# Patient Record
Sex: FEMALE | Race: WHITE | Employment: FULL TIME | URBAN - METROPOLITAN AREA
[De-identification: names, ages, dates, MRNs, and addresses within clinical notes are randomized per-mention and may not be internally consistent; named-entity substitution may affect disease eponyms.]

---

## 2017-06-01 PROBLEM — R10.2 PERINEAL PAIN: Status: ACTIVE | Noted: 2017-06-01

## 2017-06-01 PROBLEM — K64.9 HEMORRHOIDS: Status: ACTIVE | Noted: 2017-06-01

## 2017-06-01 PROBLEM — R32 INCONTINENCE OF URINE IN FEMALE: Status: ACTIVE | Noted: 2017-06-01

## 2017-07-03 PROBLEM — N95.0 POSTMENOPAUSAL BLEEDING: Status: ACTIVE | Noted: 2017-07-03

## 2017-07-12 ENCOUNTER — HOSPITAL ENCOUNTER (OUTPATIENT)
Dept: LAB | Age: 57
Discharge: HOME | End: 2017-07-12

## 2017-07-12 PROBLEM — N93.9 ABNORMAL UTERINE BLEEDING (AUB): Status: ACTIVE | Noted: 2017-07-12

## 2017-07-12 PROBLEM — N88.2 CERVICAL STENOSIS (UTERINE CERVIX): Status: ACTIVE | Noted: 2017-07-12

## 2017-07-12 PROCEDURE — 88305 TISSUE EXAM BY PATHOLOGIST: CPT | Performed by: OBSTETRICS & GYNECOLOGY

## 2019-08-07 PROBLEM — N81.6 RECTOCELE: Status: ACTIVE | Noted: 2019-08-07

## 2019-08-07 PROBLEM — N32.81 OAB (OVERACTIVE BLADDER): Status: ACTIVE | Noted: 2019-08-07

## 2019-08-20 ENCOUNTER — HOSPITAL ENCOUNTER (OUTPATIENT)
Dept: PHYSICAL THERAPY | Age: 59
Discharge: HOME | End: 2019-08-20
Attending: OBSTETRICS & GYNECOLOGY
Payer: COMMERCIAL

## 2019-08-20 DIAGNOSIS — N32.81 OAB (OVERACTIVE BLADDER): ICD-10-CM

## 2019-08-20 PROCEDURE — 97110 THERAPEUTIC EXERCISES: CPT

## 2019-08-20 PROCEDURE — 97161 PT EVAL LOW COMPLEX 20 MIN: CPT

## 2019-08-20 NOTE — THERAPY EVALUATION
Miesha Lutz  : 1960  Primary: Za Del Valle  Secondary:  [address]  at [facility]  [address], [place]  [phone]   [phone]        OUTPATIENT PHYSICAL THERAPY:Initial Assessment 2019   ICD-10: Treatment Diagnosis: R27.8 Lack of coordination (muscle incoordination), N81.84 Pelvic Muscle Wasting, N39.46 Mixed incontinence (Urge and stress incontinence), N81.6 Rectocele (Prolapse of posterior vaginal wall)  Precautions/Allergies:   Patient has no known allergies. TREATMENT PLAN:  Effective Dates: 2019 TO 2019 (90 days). Frequency/Duration: 1 time a week for 90 Day(s) MEDICAL/REFERRING DIAGNOSIS:  OAB (overactive bladder) [N32.81]   DATE OF ONSET: chronic  REFERRING PHYSICIAN: Yuri Onofre DO MD Orders: evaluate and treat  Return MD Appointment: 2019     INITIAL ASSESSMENT:  Ms. Javy Trent presents with pelvic floor muscle (PFM) lack of coordination, timing, awareness, endurance and strength. She is unable to pair a PFM contraction with normal breathing, contributing to stress urinary incontinence (TRINO) and prolapse progression risk. She also has some mild bladder health habits that may be worsening symptoms and contributing to constipation. She will benefit from skilled PT with an emphasis on PFM retraining, core retraining, body mechanics and bladder/bowel health education to improve her evacuation and improved urinary control. She is pleasant and motivated. PROBLEM LIST (Impacting functional limitations):  1. Decreased Strength  2. Decreased ADL/Functional Activities  3. Decreased Gordon with Home Exercise Program  4. Decreased timing, coordination and awareness INTERVENTIONS PLANNED:  1. Electrical Stimulation  2. Home Exercise Program (HEP)  3. Manual Therapy  4. Neuromuscular Re-education/Strengthening  5. Therapeutic Activites  6.  Therapeutic Exercise/Strengthening     GOALS: (Goals have been discussed and agreed upon with patient.)  Short-Term Functional Goals: Time Frame: 6 weeks  Pt will demonstrate I with basic PFM HEP to improve awareness, coordination, and timing of PFM. Pt will be able to demonstrate isolated PFM contraction without compensation. Pt will independently perform proper toilet position to improve bowel emptying and decrease pain with bowel movements. Discharge Goals: Time Frame: 12 weeks  1. Pt will be able to verbalize understanding of prolapse management in order to reduce risk from progression of prolapse. 2. Pt will demonstrate 10 quick flicks of the pelvic floor muscle group, without compensation, to implement urge suppression appropriately with urgency of urination and decrease the number of pads used per day. 3. Pt will be able to demonstrate use of 'the knack' in order to reduce UI with stress related activities. Outcome Measure:   Pelvic Floor Impact Questionnaire (PFIQ-7)  Score (out of 300) Initial: 8/20/2019  Bladder/urinary: 10  Bowel/rectum: 10  Vagina/pelvis: 10  Total: 30 Most Recent:      Interpretation of Score: This survey asks questions concerning certain bowel, bladder, or pelvic symptoms and how much these symptoms interfere with daily activities. Each section is scored on a 0-3 scale, 3 representing the greatest disability. The scores of each section (out of 100) are added together for a total score out of 300. Medical Necessity:   · Patient is expected to demonstrate progress in strength, range of motion, coordination and functional technique to decrease risk from progression of prolapse and improve bowel evacuation. Reason for Services/Other Comments:  · Patient continues to require skilled intervention due to above mentioned deficits.   Total Duration: Evaluation 40 minutes, treatment 20 minutes  PT Patient Time In/Time Out  Time In: 1610  Time Out: 1720    Rehabilitation Potential For Stated Goals: Good  Regarding Jake Kumar therapy, I certify that the treatment plan above will be carried out by a therapist or under their direction. Thank you for this referral,  Golden Chlid, PT, DPT               PAIN/SUBJECTIVE:   Initial: Pain Intensity 1: 0  Post Session:  0/10     HISTORY:   History of Present Injury/Illness (Reason for Referral):  Pt is a 62 yo F. She had a bladder sling in 2006/2007 due to TRINO and UUI. This helped for some time. About a year ago she noticed a bulge in vaginal area. She saw her GYN but was not provided any options for treatment at that time. She found Dr. Annie Moody and saw her recently. Reports significant difficulty emptying her bowels, \"I feel like I have to lay down to get things out\". Urinary: Frequency 7-8 x/day, 1-2 x/night. Positive for stress urinary incontinence (TRINO)-mild, urge urinary incontinence (UUI), incomplete emptying, urinary hesitancy, nocturia. Pt occasionally uses pads for protection; 0-1 pads per day (PPD). Denies urgency, frequency, dysuria, hematuria. Fluid intake: 40-60 oz water/day; bladder irritants include: gatorade, 2c coffee, tea (occasionally)  Bowel: Frequency 2-3x/day. Positive for pain with bowel movement (BM), pushing/straining with BM, incomplete emptying, fecal incontinence-smearing, occasionally, constipation. Holiday stool type: variable. Use of stool softeners or laxatives? Yes  Sexual: Pt is sexually active. Male partners. Positive for dyspareunia, \"burning\"  Pelvic Organ Prolapse/Pelvic Pain: Location: vaginal pain w/ intercourse, rectal pain w/ BM. Worse with bowel movements, intercourse. Relieved with nothing. Max pain 2-3/10. Min pain 0/10. Past Medical History/Comorbidities:   Ms. Brad Thompson  has a past medical history of GERD (gastroesophageal reflux disease), Hypercholesterolemia, and Thyroid disease.   Ms. Brad Thompson  has a past surgical history that includes hx urological; hx dilation and evacuation; hx wrist fracture tx (Left); and hx colonoscopy. Social History/Living Environment:     Pt lives with , daughter and granddaughter  Have you ever had any pelvic trauma (orthopedic in nature, fall, MVA, etc.)? No  Have you ever experienced any unwanted physical or sexual contact? No  Have you ever experienced any form of medical trauma (GYN, urological, GI, etc)? No    Prior Level of Function/Work/Activity:  Pt is a school nurse at [employer]     Ambulatory/Rehab [address] [place] [address] Risk Assessment    Risk Factors:       No Risk Factors Identified Ability to Rise from Chair:       (0)  Ability to rise in a single movement    Falls Prevention Plan:       No modifications necessary   Total: (5 or greater = High Risk): 0    ©2010 [facility] [address] [place] [address] States Patent #[id number]. Federal Law prohibits the replication, distribution or use without written permission from [facility] [employer]     Current Medications:       Current Outpatient Medications:     metFORMIN (GLUCOPHAGE) 1,000 mg tablet, Take 1,000 mg by mouth., Disp: , Rfl:     rosuvastatin (CRESTOR) 20 mg tablet, TK 1 T PO QHS, Disp: , Rfl: 0    cholecalciferol, vitD3,/vit K2 (VITAMIN D3-VITAMIN K2 PO), Take  by mouth., Disp: , Rfl:     PROGESTERONE, Take 50 mg by mouth., Disp: , Rfl:     mirabegron ER (MYRBETRIQ) 25 mg ER tablet, Take 1 Tab by mouth daily. Indications: Urine Leakage When there is a Strong Desire to Void, Disp: 30 Tab, Rfl: 12    esomeprazole (NEXIUM) 20 mg capsule, Take  by mouth daily. , Disp: , Rfl:     hydrocortisone-pramoxine (ANALPRAM-HC) 2.5-1 % (4g) cream, Apply  to affected area three (3) times daily. , Disp: 1 Tube, Rfl: 5   Date Last Reviewed:  8/21/2019   Number of Personal Factors/Comorbidities that affect the Plan of Care: 0: LOW COMPLEXITY   EXAMINATION:   OBSERVATION:   External Observation:   · Voluntary Contraction: present  · Voluntary Relaxation: present    PALPATION: Non tender with palpation via vaginal canal. Mild burning sensation with palpation of superficial mm. Significantly distended rectum limited depth of palpation. RANGE OF MOTION:  Limited 2/2 weakness    STRENGTH:  P: Power, E: Endurance, R: Repetitions, QF: Quick Flicks, TrA: Transverse Abdominus  P trace   E Unable to maintain   R NT   QF NT   TrA      SPECIAL TESTS:  POP-Q: (Pelvic Organ Prolapse - Quantification Exam) per MD note:   ICS Stage                     Anterior:  Stage 0   Posterior:  Stage 3   Apical:  Stage 0      Body Structures Involved:  1. Muscles Body Functions Affected:  1. Sensory/Pain  2. Genitourinary  3. Neuromusculoskeletal  4. Movement Related Activities and Participation Affected:  1. Learning and Applying Knowledge  2. General Tasks and Demands  3. Mobility  4. Self Care   Number of elements (examined above) that affect the Plan of Care: 1-2: LOW COMPLEXITY   CLINICAL PRESENTATION:   Presentation: Stable and uncomplicated: LOW COMPLEXITY   CLINICAL DECISION MAKING:   Use of outcome tool(s) and clinical judgement create a POC that gives a: Clear prediction of patient's progress: LOW COMPLEXITY        Laxmi Johnson, PT, DPT

## 2019-08-21 NOTE — PROGRESS NOTES
Taylor Carolin  : 1960  Primary: Verdis [place]  Secondary:  [address]  at [facility]  [address], [address], [place] [address]  [phone]   [phone]      OUTPATIENT PHYSICAL THERAPY: Daily Treatment Note 2019   Visit Count:  1  ICD-10: Treatment Diagnosis: R27.8 Lack of coordination (muscle incoordination), N81.84 Pelvic Muscle Wasting, N39.46 Mixed incontinence (Urge and stress incontinence), N81.6 Rectocele (Prolapse of posterior vaginal wall)  Precautions/Allergies:   Patient has no known allergies. TREATMENT PLAN:  Effective Dates: 2019 TO 2019 (90 days). Frequency/Duration: 1 time a week for 90 Day(s) MEDICAL/REFERRING DIAGNOSIS:  OAB (overactive bladder) [N32.81]   DATE OF ONSET: chronic  REFERRING PHYSICIAN: Neftaly Warner DO MD Orders: evaluate and treat  Return MD Appointment: 2019     Pre-treatment Symptoms/Complaints:  Rectocele, incomplete emptying, OAB  Pain: Initial: Pain Intensity 1: 0  Post Session:  0/10   Medications Last Reviewed:  2019  Updated Objective Findings:  See evaluation note from today   TREATMENT:   THERAPEUTIC EXERCISE: (20 minutes):  Exercises per grid below to improve mobility, coordination and positioning. Required moderate verbal cues to promote proper body breathing techniques and improve isolation of contraction. Progressed repetitions as indicated. TE= therapeutic exercise, TA= therapeutic activity, MT= manual therapy, NE= neuro re-education   Date:  2019 Date:   Date:     Activity/Exercise Parameters Parameters Parameters   PFM coordination Isolation of contraction w/ max VC     Evacuation strategies Toilet positioning, splinting 10 minutes                                     Pt gives verbal consent to internal vaginal assessment/treatment without chaperon present.     Treatment/Session Summary:    · Response to Treatment:  Pt reports good understanding of plan of care, as well as prescribed home exercise program.  All questions were answered to pt's satisfaction. Pt was invited to call with any further questions or concerns. · Communication/Consultation:  None today  · Equipment provided today:  None today  · Recommendations/Intent for next treatment session: Next visit will focus on strength, coordination, biofeedback.   Total Treatment Billable Duration: Evaluation 40 minutes, treatment 20 minutes  PT Patient Time In/Time Out  Time In: 1610  Time Out: [address] N[place], PT, DPT    Future Appointments   Date Time Provider Jeanne Pena   9/16/2019  3:15 PM Julio C Hartmann, [address]

## 2019-09-26 ENCOUNTER — HOSPITAL ENCOUNTER (OUTPATIENT)
Dept: PHYSICAL THERAPY | Age: 59
Discharge: HOME | End: 2019-09-26
Payer: COMMERCIAL

## 2019-09-26 PROCEDURE — 97110 THERAPEUTIC EXERCISES: CPT

## 2019-09-26 PROCEDURE — 97530 THERAPEUTIC ACTIVITIES: CPT

## 2019-09-26 NOTE — PROGRESS NOTES
Chaim Sanchez  : 1960  Primary: Arabella Del Valle  Secondary:  [address]  at [facility]  [address], [address], [place] [address]  [phone]   [phone]      OUTPATIENT PHYSICAL THERAPY: Daily Treatment Note 2019   Visit Count:  2  ICD-10: Treatment Diagnosis: R27.8 Lack of coordination (muscle incoordination), N81.84 Pelvic Muscle Wasting, N39.46 Mixed incontinence (Urge and stress incontinence), N81.6 Rectocele (Prolapse of posterior vaginal wall)  Precautions/Allergies:   Patient has no known allergies. TREATMENT PLAN:  Effective Dates: 2019 TO 2019 (90 days). Frequency/Duration: 1 time a week for 90 Day(s) MEDICAL/REFERRING DIAGNOSIS:  OAB (overactive bladder) [N32.81]   DATE OF ONSET: chronic  REFERRING PHYSICIAN: Jorge Holt DO MD Orders: evaluate and treat  Return MD Appointment: 2019     Pre-treatment Symptoms/Complaints:  Inconsistent with HEP. Having less leakage with mybetriq. Pain: Initial: Pain Intensity 1: 0  Post Session:  0/10   Medications Last Reviewed:  2019  Updated Objective Findings:  See evaluation note from today   TREATMENT:   THERAPEUTIC ACTIVITY: ( 10 minutes): Therapeutic activities per grid below to improve bowel consistency. Required minimal verbal cues to promote increase fiber in order to improve stool consistency. THERAPEUTIC EXERCISE: (45 minutes):  Exercises per grid below to improve mobility, strength, coordination and isolation of PFM. Required maximal verbal and tactile cues to promote proper body breathing techniques and improve isolation of contraction. Progressed resistance, range, repetitions and complexity of movement as indicated.   TE= therapeutic exercise, TA= therapeutic activity, MT= manual therapy, NE= neuro re-education   Date:  2019 Date:  2019 Date:     Activity/Exercise Parameters Parameters Parameters   PFM coordination Isolation of contraction w/ max VC Max verbal/tactile cuing to improve PFM recruitment and activation    Evacuation strategies Toilet positioning, splinting 10 minutes     TA bracing  W/ manual cuing and using exhale to improve TA activation    Hip adduction  3x10 w/ exhale to improve TA actviation    Bridge  x20    Glute sets  x30 while maintain normal breathing    HEP  TA bracing, adductor squeeze and PFM contraction      Pt gives verbal consent to internal vaginal assessment/treatment without chaperon present. Treatment/Session Summary:    · Response to Treatment:  Pt with significant difficult recruiting PFM without compensation even with max verbal and tactile cuing. She had improve PFM recruitment with use of glutes. We began overflow recruitment exercises today to help to improve PFM activation and strength to then transition into isolation. · Communication/Consultation:  None today  · Equipment provided today:  None today  · Recommendations/Intent for next treatment session: Next visit will focus on strength, coordination.   Total Treatment Billable Duration: 10 minutes TA, 45 minutes TE  PT Patient Time In/Time Out  Time In: 1700  Time Out: 1800  Karson Krueger PT, DPT    Future Appointments   Date Time Provider Jeanne Pena   10/3/2019  5:00 PM Elias Cespedes PT, DPT [facility]   10/8/2019  5:00 PM Elias Cespedes PT, DPT [facility]   10/15/2019  5:00 PM Elias Cespedes PT, DPT [facility]   2/10/2020  3:00 PM DO Joh[address]

## 2019-10-15 ENCOUNTER — APPOINTMENT (OUTPATIENT)
Dept: PHYSICAL THERAPY | Age: 59
End: 2019-10-15
Payer: COMMERCIAL

## 2019-10-23 ENCOUNTER — HOSPITAL ENCOUNTER (OUTPATIENT)
Dept: PHYSICAL THERAPY | Age: 59
Discharge: HOME | End: 2019-10-23
Payer: COMMERCIAL

## 2019-10-23 PROCEDURE — 97110 THERAPEUTIC EXERCISES: CPT

## 2019-10-23 NOTE — PROGRESS NOTES
Aye Marrufo  : 1960  Primary: Delmi Cobos [facility]  Secondary:  [address] at [facility]  [address], [address], [place] [address]  [phone]   [phone]      OUTPATIENT PHYSICAL THERAPY: Daily Treatment Note 10/23/2019   Visit Count:  3  ICD-10: Treatment Diagnosis: R27.8 Lack of coordination (muscle incoordination), N81.84 Pelvic Muscle Wasting, N39.46 Mixed incontinence (Urge and stress incontinence), N81.6 Rectocele (Prolapse of posterior vaginal wall)  Precautions/Allergies:   Patient has no known allergies. TREATMENT PLAN:  Effective Dates: 2019 TO 2019 (90 days). Frequency/Duration: 1 time a week for 90 Day(s) MEDICAL/REFERRING DIAGNOSIS:  OAB (overactive bladder) [N32.81]   DATE OF ONSET: chronic  REFERRING PHYSICIAN: Andrés Madsen DO MD Orders: evaluate and treat  Return MD Appointment: 2/10/2020     Pre-treatment Symptoms/Complaints:  Pt reports that medication is helping some. She has been increasing fiber. Does HEP exercises about 2x/day. She think that leakage is improving; has good and worse days. Pain: Initial: Pain Intensity 1: 0  Post Session:  0/10   Medications Last Reviewed:  10/23/2019  Updated Objective Findings:  Trace contraction; more consistent with ability to recruit   TREATMENT:   THERAPEUTIC ACTIVITY: ( 10 minutes): Therapeutic activities per grid below to improve bowel consistency. Required minimal verbal cues to promote increase fiber in order to improve stool consistency. THERAPEUTIC EXERCISE: (45 minutes):  Exercises per grid below to improve mobility, strength, coordination and isolation of PFM. Required maximal verbal and tactile cues to promote proper body breathing techniques and improve isolation of contraction. Progressed resistance, range, repetitions and complexity of movement as indicated.   TE= therapeutic exercise, TA= therapeutic activity, MT= manual therapy, NE= neuro re-education Date:  8/21/2019 Date:  9/26/2019 Date:  10/23/2019   Activity/Exercise Parameters Parameters Parameters   PFM coordination Isolation of contraction w/ max VC Max verbal/tactile cuing to improve PFM recruitment and activation W/ manual palpation and tactile cuing to improve PFM activation   Evacuation strategies Toilet positioning, splinting 10 minutes     TA bracing  W/ manual cuing and using exhale to improve TA activation    Hip adduction  3x10 w/ exhale to improve TA actviation 3x10 w/ exhale to improve TA actviation   Bridge  x20 3x10   Glute sets  x30 while maintain normal breathing    HEP  TA bracing, adductor squeeze and PFM contraction Add hip abd and marching; chosse 3 exercises to complete 2x/daily, continue with 3H and QF 3x/day   Hip abduction   3x10 w/ RTB   Marching   3x10 seated   Urge suppression   10 minutes     Pt gives verbal consent to internal vaginal assessment/treatment without chaperon present. Treatment/Session Summary:    · Response to Treatment:  Pt with slightly improved PFM activation; trace contraction still present, however more consistent. Exercises were progressed mostly in gravity reduce/eliminated positions in order to optimize PFM contraction. Pt was educated in urge suppression technique in order to implement during day to improve urinary control. · Communication/Consultation:  None today  · Equipment provided today:  None today  · Recommendations/Intent for next treatment session: Next visit will focus on strength, coordination.   Total Treatment Billable Duration: 55 minutes TE  PT Patient Time In/Time Out  Time In: 5160  Time Out: [address] [facility], PT, DPT    Future Appointments   Date Time Provider Jeanne Pena   10/30/2019  5:00 PM Elvi Christian DPT [facility]   11/14/2019  5:00 PM Iker Neal PT DPT [facility]   11/26/2019  4:00 PM [facility]   2/10/2020  3:00 PM DO Johan Herring.

## 2019-10-30 ENCOUNTER — HOSPITAL ENCOUNTER (OUTPATIENT)
Dept: PHYSICAL THERAPY | Age: 59
Discharge: HOME | End: 2019-10-30
Payer: COMMERCIAL

## 2019-10-30 PROCEDURE — 97110 THERAPEUTIC EXERCISES: CPT

## 2019-10-30 NOTE — PROGRESS NOTES
Balta Price  : 1960  Primary: Ernestina Castellon [facility]  Secondary:  [address] at [facility]  [address], [address], [place] [address]  [phone]   [phone]      OUTPATIENT PHYSICAL THERAPY: Daily Treatment Note 10/30/2019   Visit Count:  4  ICD-10: Treatment Diagnosis: R27.8 Lack of coordination (muscle incoordination), N81.84 Pelvic Muscle Wasting, N39.46 Mixed incontinence (Urge and stress incontinence), N81.6 Rectocele (Prolapse of posterior vaginal wall)  Precautions/Allergies:   Patient has no known allergies. TREATMENT PLAN:  Effective Dates: 2019 TO 2019 (90 days). Frequency/Duration: 1 time a week for 90 Day(s) MEDICAL/REFERRING DIAGNOSIS:  OAB (overactive bladder) [N32.81]   DATE OF ONSET: chronic  REFERRING PHYSICIAN: Hanna Awad DO MD Orders: evaluate and treat  Return MD Appointment: 2/10/2020     Pre-treatment Symptoms/Complaints:  Reports being able to using urge suppression with some success this last week. Pain: Initial: Pain Intensity 1: 0  Post Session:  0/10   Medications Last Reviewed:  10/30/2019  Updated Objective Findings:  None Today   TREATMENT:   THERAPEUTIC ACTIVITY: ( 10 minutes): Therapeutic activities per grid below to improve bowel consistency. Required minimal verbal cues to promote increase fiber in order to improve stool consistency. THERAPEUTIC EXERCISE: (45 minutes):  Exercises per grid below to improve mobility, strength, coordination and isolation of PFM. Required maximal verbal and tactile cues to promote proper body breathing techniques and improve isolation of contraction. Progressed resistance, range, repetitions and complexity of movement as indicated.   TE= therapeutic exercise, TA= therapeutic activity, MT= manual therapy, NE= neuro re-education   Date:  2019 Date:  2019 Date:  10/23/2019 Date:  10/30/2019   Activity/Exercise Parameters Parameters Parameters    PFM coordination Isolation of contraction w/ max VC Max verbal/tactile cuing to improve PFM recruitment and activation W/ manual palpation and tactile cuing to improve PFM activation    Evacuation strategies Toilet positioning, splinting 10 minutes      TA bracing  W/ manual cuing and using exhale to improve TA activation     Hip adduction  3x10 w/ exhale to improve TA actviation 3x10 w/ exhale to improve TA actviation 3x10 w/ exhale to improve TA actviation   Bridge  x20 3x10 3x10 w/ hip add  3x10 w/ shoulder press     Glute sets  x30 while maintain normal breathing     HEP  TA bracing, adductor squeeze and PFM contraction Add hip abd and marching; chosse 3 exercises to complete 2x/daily, continue with 3H and QF 3x/day    Hip abduction   3x10 w/ RTB 3x10 w/ RTB   Marching   3x10 seated 3x10   Urge suppression   10 minutes    Stepper    10 minutes   Sit to stand    3x10   Step up    2x10   Sidestepping     2x40ft RTB     Pt gives verbal consent to internal vaginal assessment/treatment without chaperon present. Treatment/Session Summary:    · Response to Treatment:  Pt tolerated progression of exercises well with attention to PFM contraction and proper use of breath in order to minimize pelvic pressure and pelvic organ support. She demonstrates good awareness of breathing and was able to progress into more gravity dependent positions with more complex movement patterns. · Communication/Consultation:  None today  · Equipment provided today:  None today  · Recommendations/Intent for next treatment session: Next visit will focus on strength, coordination.   Total Treatment Billable Duration: 55 minutes TE  PT Patient Time In/Time Out  Time In: Won Prince PT, DPT    Future Appointments   Date Time Provider Jeanne Pena   11/14/2019  5:00 PM Jose Manuel Gibson, PT, DPT [facility]   11/26/2019  4:00 PM Tobias Johnson, PT, DPT [facility]   2/10/2020  3:00 PM Aniyah Lopez , DO Johan Andrew.

## 2019-11-14 ENCOUNTER — HOSPITAL ENCOUNTER (OUTPATIENT)
Dept: PHYSICAL THERAPY | Age: 59
Discharge: HOME | End: 2019-11-14
Payer: COMMERCIAL

## 2019-11-14 PROCEDURE — 97110 THERAPEUTIC EXERCISES: CPT

## 2019-11-14 NOTE — PROGRESS NOTES
Jana Ryan  : 1960  Primary: [facility]  Secondary:  [address] at [facility]  [address], [address], [place]  [phone]   [phone]      OUTPATIENT PHYSICAL THERAPY: Daily Treatment Note 2019   Visit Count:  5  ICD-10: Treatment Diagnosis: R27.8 Lack of coordination (muscle incoordination), N81.84 Pelvic Muscle Wasting, N39.46 Mixed incontinence (Urge and stress incontinence), N81.6 Rectocele (Prolapse of posterior vaginal wall)  Precautions/Allergies:   Patient has no known allergies. TREATMENT PLAN:  Effective Dates: 2019 TO 2019 (90 days). Frequency/Duration: 1 time a week for 90 Day(s) MEDICAL/REFERRING DIAGNOSIS:  OAB (overactive bladder) [N32.81]   DATE OF ONSET: chronic  REFERRING PHYSICIAN: Christiano Albert DO  MD Orders: evaluate and treat  Return MD Appointment: 2/10/2020     Pre-treatment Symptoms/Complaints: Pt reports only having one \"bad\" episode of incontinence. She thinks that she had drank a lot more caffeine that day. States that she is not having much leakage in the AM upon waking, which used to happen. Pain: Initial: Pain Intensity 1: 0  Post Session:  0/10   Medications Last Reviewed:  2019  Updated Objective Findings:  None Today   TREATMENT:   THERAPEUTIC EXERCISE: (55 minutes):  Exercises per grid below to improve mobility, strength and coordination. Required minimal verbal and tactile cues to promote proper body breathing techniques. Progressed resistance, range, repetitions and complexity of movement as indicated.   TE= therapeutic exercise, TA= therapeutic activity, MT= manual therapy, NE= neuro re-education   Date:  2019 Date:  2019 Date:  10/23/2019 Date:  10/30/2019 Date:  2019   Activity/Exercise Parameters Parameters Parameters     PFM coordination Isolation of contraction w/ max VC Max verbal/tactile cuing to improve PFM recruitment and activation W/ manual palpation and tactile cuing to improve PFM activation     Evacuation strategies Toilet positioning, splinting 10 minutes       TA bracing  W/ manual cuing and using exhale to improve TA activation      Hip adduction  3x10 w/ exhale to improve TA actviation 3x10 w/ exhale to improve TA actviation 3x10 w/ exhale to improve TA actviation 3x10 w/ exhale to improve PFM activation   Bridge  x20 3x10 3x10 w/ hip add  3x10 w/ shoulder press   3x10 w/ hip add  3x10 w/ hip abd RTB   Glute sets  x30 while maintain normal breathing      HEP  TA bracing, adductor squeeze and PFM contraction Add hip abd and marching; chosse 3 exercises to complete 2x/daily, continue with 3H and QF 3x/day     Hip abduction   3x10 w/ RTB 3x10 w/ RTB 3x10 w/ RTB   Marching   3x10 seated 3x10    Urge suppression   10 minutes     Stepper    10 minutes 10 minutes   Sit to stand    3x10    Step up    2x10    Sidestepping     2x40ft RTB 2x40ft RTB   Walking monster     2x40ft RTB     Pt gives verbal consent to internal vaginal assessment/treatment without chaperon present. Treatment/Session Summary:    · Response to Treatment:  Pt is progressing with exercises. She has increased challenge with incorporation of more standing exercises. She was encouraged to practice kegels in various positions as able to increase challenge. · Communication/Consultation:  None today  · Equipment provided today:  None today  · Recommendations/Intent for next treatment session: Next visit will focus on reassessment of strength.   Total Treatment Billable Duration: 55 minutes TE  PT Patient Time In/Time Out  Time In: 1700  Time Out: 1800  Asia Azevedo PT, DPT    Future Appointments   Date Time Provider Jeanne Pena   11/26/2019  4:00 PM Elvi Ramirez, DPJOSE [facility]   2/10/2020  3:00 PM Luiz Lopez DO [address]

## 2019-11-26 ENCOUNTER — HOSPITAL ENCOUNTER (OUTPATIENT)
Dept: PHYSICAL THERAPY | Age: 59
Discharge: HOME | End: 2019-11-26
Payer: COMMERCIAL

## 2019-11-26 PROCEDURE — 97110 THERAPEUTIC EXERCISES: CPT

## 2019-11-26 NOTE — THERAPY DISCHARGE
Mahi Macdonald  : 1960  Primary: [facility]  Secondary:  [address]  at [facility]  [address] , [address], [place] [address]  [phone]   [phone]        OUTPATIENT PHYSICAL THERAPY:Discharge 2019   ICD-10: Treatment Diagnosis: R27.8 Lack of coordination (muscle incoordination), N81.84 Pelvic Muscle Wasting, N39.46 Mixed incontinence (Urge and stress incontinence), N81.6 Rectocele (Prolapse of posterior vaginal wall)  Precautions/Allergies:   Patient has no known allergies. TREATMENT PLAN:  Effective Dates: 2019 TO 2019 (90 days). Frequency/Duration: 1 time a week for 90 Day(s) MEDICAL/REFERRING DIAGNOSIS:  OAB (overactive bladder) [N32.81]   DATE OF ONSET: chronic  REFERRING PHYSICIAN: Shelbi Bustamante DO  MD Orders: evaluate and treat  Return MD Appointment: 2/10/2020     DISCHARGE ASSESSMENT:  Ms. Corey Cerna has been seen in skilled PT from 2019 to 2019, a total of 6 visits. She cancelled 2 treatments during her plan of care. Treatment has emphasized pelvic floor muscle (PFM) coordination, strength, voiding strategies, and bladder control and health techniques. Patient responded well to therapy, with improvements in urinary incontinence, bowel and bladder emptying, PFM strength and coordination and pelvic pressure symptoms. She has achieved her goals as indicated below and all questions have been answered to her satisfaction. GOALS: (Goals have been discussed and agreed upon with patient.)  Short-Term Functional Goals: Time Frame: 6 weeks  Pt will demonstrate I with basic PFM HEP to improve awareness, coordination, and timing of PFM. (MET 2019)  Pt will be able to demonstrate isolated PFM contraction without compensation. ([place] 2019)  Pt will independently perform proper toilet position to improve bowel emptying and decrease pain with bowel movements.  (MET 2019)  Discharge Goals: Time Frame: 12 weeks  1. Pt will be able to verbalize understanding of prolapse management in order to reduce risk from progression of prolapse. (MET 11/26/2019)  2. Pt will demonstrate 10 quick flicks of the pelvic floor muscle group, without compensation, to implement urge suppression appropriately with urgency of urination and decrease the number of pads used per day. (PROGRESSING 11/26/2019)  3. Pt will be able to demonstrate use of 'the knack' in order to reduce UI with stress related activities. (MET 11/26/2019)    History of Present Injury/Illness (Reason for Referral):  Pt is a 62 yo F. She had a bladder sling in 2006/2007 due to TRINO and UUI. This helped for some time. About a year ago she noticed a bulge in vaginal area. She saw her GYN but was not provided any options for treatment at that time. She found Dr. Shaan Hall and saw her recently. Reports significant difficulty emptying her bowels, \"I feel like I have to lay down to get things out\". Urinary: Frequency 4x/day, 0-1x/night. Positive for urge urinary incontinence (UUI)- using urge suppression to control and is doing well with this overall, incomplete emptying- only in AM. Pt does not use pads for protection; 0 pads per day (PPD). Denies urgency, frequency, dysuria, hematuria, stress urinary incontinence, urinary hesitancy. Fluid intake: 40-60 oz water/day; bladder irritants include: gatorade, 2c coffee, tea (occasionally)  Bowel: Frequency 1x/day. Positive for pushing/straining with BM- using miralax to minimize pushing, fecal incontinence-smearing, occasionally. Wilkinson stool type: variable. Use of stool softeners or laxatives? Yes; miralax, benefiber  Sexual: Pt is sexually active. Male partners. Positive for dyspareunia, \"burning\"  Pelvic Organ Prolapse/Pelvic Pain: Denies pain complaints.     UPDATED OBJECTIVE FINDINGS:    OBSERVATION:   External Observation:   · Voluntary Contraction: present, with compensation  · Voluntary Relaxation: present    PALPATION: Non tender with palpation via vaginal canal.     RANGE OF MOTION: Limited 2/2 weakness    STRENGTH:  P: Power, E: Endurance, R: Repetitions, QF: Quick Flicks, TrA: Transverse Abdominus  P 1/5   E 7 seconds   R 7   QF 8   TrA      Outcome Measure:   Pelvic Floor Impact Questionnaire (PFIQ-7)  Score (out of 300) Initial: 8/20/2019  Bladder/urinary: 10  Bowel/rectum: 10  Vagina/pelvis: 10  Total: 30 Most Recent: 11/26/2019  Bladder/urinary: 14  Bowel/rectum: 0  Vagina/pelvis: 0  Total: 14     Interpretation of Score: This survey asks questions concerning certain bowel, bladder, or pelvic symptoms and how much these symptoms interfere with daily activities. Each section is scored on a 0-3 scale, 3 representing the greatest disability. The scores of each section (out of 100) are added together for a total score out of 300.     Total Duration:  PT Patient Time In/Time Out  Time In: 1600  Time Out: 1700    Rehabilitation Potential For Stated Goals: Good  Thank you for this referral,  Geraldene Fabry, PT, DPT

## 2019-11-26 NOTE — PROGRESS NOTES
Christine Self  : 1960  Primary: [facility]  Secondary:  [address] at [facility]  [address], [address], [place] [address]  [phone]   [phone]      OUTPATIENT PHYSICAL THERAPY: Daily Treatment Note 2019   Visit Count:  6  ICD-10: Treatment Diagnosis: R27.8 Lack of coordination (muscle incoordination), N81.84 Pelvic Muscle Wasting, N39.46 Mixed incontinence (Urge and stress incontinence), N81.6 Rectocele (Prolapse of posterior vaginal wall)  Precautions/Allergies:   Patient has no known allergies. TREATMENT PLAN:  Effective Dates: 2019 TO 2019 (90 days). Frequency/Duration: 1 time a week for 90 Day(s) MEDICAL/REFERRING DIAGNOSIS:  OAB (overactive bladder) [N32.81]   DATE OF ONSET: chronic  REFERRING PHYSICIAN: Javier Lopez DO MD Orders: evaluate and treat  Return MD Appointment: 2/10/2020     Pre-treatment Symptoms/Complaints: Doing well over all. Less frequency of pelvic pressure. Pain: Initial: Pain Intensity 1: 0  Post Session:  0/10   Medications Last Reviewed:  2019  Updated Objective Findings:  See discharge assessment   TREATMENT:   THERAPEUTIC EXERCISE: (55 minutes):  Exercises per grid below to improve mobility, strength and coordination. Required minimal verbal and tactile cues to promote proper body breathing techniques. Progressed resistance, range, repetitions and complexity of movement as indicated.   TE= therapeutic exercise, TA= therapeutic activity, MT= manual therapy, NE= neuro re-education   Date:  2019 Date:  10/23/2019 Date:  10/30/2019 Date:  2019 Date:  2019   Activity/Exercise Parameters Parameters      PFM coordination Max verbal/tactile cuing to improve PFM recruitment and activation W/ manual palpation and tactile cuing to improve PFM activation   Coordination and strength w/ holds and QF; manual palpation   Evacuation strategies        TA bracing W/ manual cuing and using exhale to improve TA activation       Hip adduction 3x10 w/ exhale to improve TA actviation 3x10 w/ exhale to improve TA actviation 3x10 w/ exhale to improve TA actviation 3x10 w/ exhale to improve PFM activation    Bridge x20 3x10 3x10 w/ hip add  3x10 w/ shoulder press   3x10 w/ hip add  3x10 w/ hip abd RTB    Glute sets x30 while maintain normal breathing       HEP TA bracing, adductor squeeze and PFM contraction Add hip abd and marching; chosse 3 exercises to complete 2x/daily, continue with 3H and QF 3x/day   Updated and reviewed   Hip abduction  3x10 w/ RTB 3x10 w/ RTB 3x10 w/ RTB    Marching  3x10 seated 3x10     Urge suppression  10 minutes      Stepper   10 minutes 10 minutes    Sit to stand   3x10     Step up   2x10  x20 (B) anterior  x20 (B) lateral   Sidestepping    2x40ft RTB 2x40ft RTB 2x40ft RTB   Walking monster    2x40ft RTB 2x40ft RTB     Pt gives verbal consent to internal vaginal assessment/treatment without chaperon present.     Treatment/Session Summary:    · Response to Treatment:  See discharge assessment  · Communication/Consultation:  None today  · Equipment provided today:  None today  Total Treatment Billable Duration: 55 minutes TE  PT Patient Time In/Time Out  Time In: 1600  Time Out: [facility] Elizabeth, PT, DPT    Future Appointments   Date Time Provider Jeanne Pena   2/10/2020  3:00 PM [place], [address]

## 2020-02-10 PROBLEM — R32 INCONTINENCE OF URINE IN FEMALE: Status: RESOLVED | Noted: 2017-06-01 | Resolved: 2020-02-10

## 2020-02-10 PROBLEM — N88.2 CERVICAL STENOSIS (UTERINE CERVIX): Status: RESOLVED | Noted: 2017-07-12 | Resolved: 2020-02-10

## 2020-02-10 PROBLEM — R10.2 PERINEAL PAIN: Status: RESOLVED | Noted: 2017-06-01 | Resolved: 2020-02-10

## 2020-02-10 PROBLEM — N93.9 ABNORMAL UTERINE BLEEDING (AUB): Status: RESOLVED | Noted: 2017-07-12 | Resolved: 2020-02-10

## 2020-05-05 PROBLEM — N81.10 VAGINAL PROLAPSE: Status: ACTIVE | Noted: 2020-05-05

## 2020-06-01 ENCOUNTER — HOSPITAL ENCOUNTER (OUTPATIENT)
Dept: SURGERY | Age: 60
Discharge: HOME | End: 2020-06-01

## 2020-06-01 VITALS — BODY MASS INDEX: 29.66 KG/M2 | WEIGHT: 189 LBS | HEIGHT: 67 IN

## 2020-06-01 RX ORDER — ASCORBIC ACID 500 MG
500 TABLET ORAL DAILY
COMMUNITY

## 2020-06-01 NOTE — PERIOP NOTES
Patient verified name and . Order for consent not found in EHR   Type 1b surgery, PAT phone assessment complete. Orders not received. Labs per surgeon: None  Labs per anesthesia protocol: None      Patient answered medical/surgical history questions at their best of ability. All prior to admission medications documented in [facility]. Patient instructed to take the following medications the day of surgery according to anesthesia guidelines with a small sip of water:  Thyroid medication and Nexium . Hold all vitamins 7 days prior to surgery and NSAIDS 5 days prior to surgery. Prescription meds to hold:None    Patient instructed on the following:  >A negative Covid swab result is required to proceed with surgery; the swab will be collected 7 days prior to surgery at the [address] [facility] at [address]. The patient will be contacted by the Covid swab team for an appointment date and time. For questions or concerns the patient should call [phone]. The clinic is closed from 12-1 for lunch and on weekends. Patient states had Covid testing done today 2020  > 1 visitor allowed at this time. >Arrive at [facility], time of arrival to be called the day before by 1700  >NPO after midnight including gum, mints, and ice chips  >Responsible adult must drive patient to the hospital, stay during surgery, and patient will need supervision 24 hours after anesthesia  >Use antibacterial soap or hibiclens in shower the night before surgery and on the morning of surgery  >All piercings must be removed prior to arrival.    >Leave all valuables (money and jewelry) at home but bring insurance card and ID on       DOS. >Do not wear make-up, nail polish, lotions, cologne, perfumes, powders, or oil on skin. Patient teach back successful and patient demonstrates knowledge of instruction.

## 2020-06-05 RX ORDER — GABAPENTIN 300 MG/1
300 CAPSULE ORAL ONCE
Status: CANCELLED | OUTPATIENT
Start: 2020-06-05 | End: 2020-06-05

## 2020-06-05 RX ORDER — ACETAMINOPHEN 500 MG
1000 TABLET ORAL ONCE
Status: CANCELLED | OUTPATIENT
Start: 2020-06-05 | End: 2020-06-05

## 2020-06-05 NOTE — H&P
History and Physical    Patient: Melissa Díaz MRN: [id number]  SSN: [id number]    YOB: 1960  Age: 63 y.o. Sex: female        Chief Complaint:  Pelvic Organ Prolapse    History of Present Illness:  Melissa Díaz is a 63 y.o. female with POP. She has some vaginal pressure and it feels like she is sitting on something and she has pushed something back up in the shower. Last summer she went to see Dr. Sherie Serrano about this- likely has had symptoms for over a year now. She regularly pushes something back up every time she shower. She has not tried a pessary or surgery for this.      She is s/p sling for TRINO. She is still leaking urine. She leaks both day and night. She is now leaking with urgency. She is sometimes wearing pads. She was leaking with full bladder at least once per day. Mostly in the morning when she first wakes up. She also notices this with caffeine consumption. She did do PT years ago and again with [facility]. She tried detrol for a few days but is afraid it will cause constipation. She has also used Myrbetriq She has not had surgery for this specifically. After starting Myrbetriq 25mg: She is not leaking as much. She may leak 2-3 times per week early in the AM. She is not wearing pads. This is also an improvement for her. After months of doing HEP, she no longer needs Myrbetriq.      She drinks 4 cups of coffee in the morning, and an occasional tea  She does not use sweeteners  She has 1 drink every 2-3 weeks     She voids 6-7 times during the day. 4-5 times on Myrbetriq. She wakes up 2-3 times at night to void  She has up to 3 BM per day, she strains for 50% of her BM. She takes magnesium when she needs to     She is sexually active with her . She denies leaking during sex and denies discomfort.      Allergies:  No Known Allergies    Medications:  Pasterone  Zinc  Ascorbid Acid  Omega 3  Rosuvastatin  Cholecalciferol  Progesterone  Nexium  Hydrocortisone-pramoxine    Past Medical History:  Past Medical History:   Diagnosis Date    GERD (gastroesophageal reflux disease)     Hypercholesterolemia     Prediabetes     not taking medication at this time 06/01/2020    PUD (peptic ulcer disease)     Thyroid disease     takes thyroid medication       Past Surgical History:  Past Surgical History:   Procedure Laterality Date    HX COLONOSCOPY      HX DILATION AND EVACUATION      x 2    HX UROLOGICAL      sling    HX WRIST FRACTURE TX Left        Family History:  Family History   Problem Relation Age of Onset    Heart Disease Father     Emphysema Father          Social History:  Social History     Tobacco Use   Smoking Status Never Smoker   Smokeless Tobacco Never Used     Social History     Substance and Sexual Activity   Alcohol Use Yes    Comment: occasionally     Social History     Substance and Sexual Activity   Drug Use No       Review of Systems:  Review of Systems:    Constitutional: Negative for fatigue and fever. Skin: Negative for rash and itching. HENT: Negative for hearing loss and vertigo. Respiratory: Negative for cough and shortness of breath. Cardiovascular:  Negative for chest pain, leg swelling and palpitations. Gastrointestinal: Negative for abdominal pain, anal bleeding, constipation, diarrhea and hemorrhoids. Genitourinary: Negative for difficulty urinating, dyspareunia, dysuria, enuresis, frequency, genital sores, hematuria, menstrual problem, pelvic pain, pelvic pressure, urgency, decreased urine volume, vaginal bleeding, vaginal discharge, vaginal pain, vaginal burning, vaginal itching, vaginal odor and vaginal lesion. Neurological: Negative for dizziness, blackouts and headaches. Psychiatric/Behavioral: Negative for behavioral problem, nervous/anxious and insomnia. Endocrine: Negative for polydipsia and polyuria.    Female Endocrine: Negative for vaginal dryness, pain with intercourse and decreased libido. Physical Exam:  Physical Exam   Constitutional: She is oriented to person, place, and time. Vital signs are normal. She appears well-developed and well-nourished. She is cooperative. No distress. HENT:   Head: Normocephalic and atraumatic. Neck: Trachea normal. No thyroid mass present. Cardiovascular: Normal rate and normal heart sounds. Pulmonary/Chest: Effort normal. No accessory muscle usage. No respiratory distress. Abdominal: Soft. Normal appearance. She exhibits no distension and no mass. There is no hepatosplenomegaly. There is no tenderness. There is no rebound and no guarding. No hernia. Genitourinary: Pelvic exam was performed with patient supine. Musculoskeletal: She exhibits no edema or tenderness. Lymphadenopathy:     She has no cervical adenopathy. She has no axillary adenopathy. Neurological: She is alert and oriented to person, place, and time. Skin: Skin is warm and dry. No lesion and no rash noted. No erythema. Psychiatric: She has a normal mood and affect. Her speech is normal and behavior is normal. Judgment and thought content normal. Cognition and memory are normal. She does not express impulsivity or inappropriate judgment.        Female Genitourinary   Vulva: - Normal. No lesions  Bartholin's Gland - Bilateral - Normal, nontender  Skenes Gland- Bilateral-Normal, nontender   Clitoris - Normal.   Introitus: Characteristics - Normal.   Urethral Meatus: - Normal appearing, normal size, no lesions, no prolapse  Urethra: No masses, no tenderness  Vagina:  No atrophy, no discharge, no lesions  Cervix: - no lesions, no discharge  Uterus: -  no tenderness, normal mobility   Adnexa: - No masses palpated, no tenderness  Bladder - no tenderness, no masses palpated  Perineum - no lesions    Rectal   Anorectal Exam: - normal     POP-Q: (Pelvic Organ Prolapse - Quantification Exam):    -3 Aa -3 Ba -10 C   3.5 gh 3 pb 10 tvl   +2 Ap +2 Bp -9 D       ICS Stage            Anterior:  Stage 0  Posterior:  Stage 3  Apical:  Stage 0         Pelvic floor muscles: Tender Spasm   Contraction    R. Puborectalis: NO 0 /5  0 /5   L. Puborectalis: NO 0 /5  0 /5   R. Pubococcyg NO 0 /5  0 /5   L. Pubococcyg NO 0 /5  0 /5   R. Ileococcyg: NO 0 /5  0 /5   L. Ileococcyg: NO 0 /5  0 /5   R. Obturator Int: NO 0 /5  0 /5   L. Obturator Int: NO 0 /5  0 /5   R. Coccygeus: NO 0 /5  0 /5   L. Coccygeus: NO 0 /5  0 /5       Supine Stress Test of TRINO:  Negative     Assessment and Plan:  Rectocele  Assessment & Plan:  Rectocele  Assessment & Plan:  We discussed the epidemiology, pathogenesis and etiology of pelvic organ prolapse. We discussed the potential risk factors which include genetics and environmental factors, such as childbirth, aging, menopause, straining, and previous surgery. I offered her management options which included nothing, pessary, and surgery.      We discussed her options of correcting the prolapse through a vaginal approach and laparoscopic approach. We also discussed repairing the vaginal prolapse with mesh and the option to do this without mesh. She has decided she would like to have a posterior repair and perineorrhaphy.      We discussed the surgical technique involved in the prolapse repair. I described the anatomic principles to the surgery as well as the success rates of conventional and compensatory repairs.     There is a 20% chance of recurrence and 5% reoperation rate.      I described the surgical technique to be employed for her upcoming surgery. We discussed the anticipated postoperative course.     Risks, benefits, indications and alternatives of the surgery were discussed. Risks reviewed with the patient include bleeding, infections, injury to pelvic organs (bowel, bladder, urethra, ureters, blood vessels, and nerves), recurrence, dyspareunia, anesthetic complications, and death.  We discussed that other complications could arise and that the list is too long to discuss comprehensively.     We discussed that some patients do fail, although not all require another surgery. We discussed the risks of repair which include pain, dysparunia, bladder and/or bowel dysfunction and sexual dysfunction.      We discussed that the success of a native tissue repair is based on proper healing and scar formation. We discussed that some patients do fail, although not all require another surgery. We discussed the risks of repair which include pain, dysparunia, bladder and/or bowel dysfunction and sexual dysfunction.       After taking all of this into account she elects to have posterior repair and perineorrhaphy. 2. Vaginal prolapse  Assessment & Plan: We will also perform an exam under anesthesia and if there are any other areas of concern for prolapse, Ms. Viviana Joseph has given me permission to do surgical correction. We discussed the additional risks and benefits of anterior and apical surgery including cystoscopy. She is consented for posterior wall repair with perineorrhaphy, possible anterior and apical repair, possible cystoscopy. The patient was counseled at length about the risks of star Covid-19 during their perioperative period and any recovery window from their procedure. The patient was made aware that star Covid-19  may worsen their prognosis for recovering from their procedure and lend to a higher morbidity and/or mortality risk. All material risks, benefits, and reasonable alternatives including postponing the procedure were discussed. The patient does wish to proceed with the procedure at this time.               Signed By: Marquis Trent DO     June 5, 2020

## 2020-06-07 ENCOUNTER — ANESTHESIA EVENT (OUTPATIENT)
Dept: SURGERY | Age: 60
End: 2020-06-07
Payer: COMMERCIAL

## 2020-06-07 RX ORDER — MIDAZOLAM HYDROCHLORIDE 1 MG/ML
2 INJECTION, SOLUTION INTRAMUSCULAR; INTRAVENOUS ONCE
Status: CANCELLED | OUTPATIENT
Start: 2020-06-07 | End: 2020-06-07

## 2020-06-07 RX ORDER — FENTANYL CITRATE 50 UG/ML
100 INJECTION, SOLUTION INTRAMUSCULAR; INTRAVENOUS ONCE
Status: CANCELLED | OUTPATIENT
Start: 2020-06-07 | End: 2020-06-07

## 2020-06-08 ENCOUNTER — HOSPITAL ENCOUNTER (OUTPATIENT)
Age: 60
Setting detail: OUTPATIENT SURGERY
Discharge: HOME | End: 2020-06-08
Attending: OBSTETRICS & GYNECOLOGY | Admitting: OBSTETRICS & GYNECOLOGY
Payer: COMMERCIAL

## 2020-06-08 ENCOUNTER — ANESTHESIA (OUTPATIENT)
Dept: SURGERY | Age: 60
End: 2020-06-08
Payer: COMMERCIAL

## 2020-06-08 VITALS
DIASTOLIC BLOOD PRESSURE: 76 MMHG | HEIGHT: 67 IN | OXYGEN SATURATION: 92 % | RESPIRATION RATE: 16 BRPM | TEMPERATURE: 98.1 F | WEIGHT: 191 LBS | BODY MASS INDEX: 29.98 KG/M2 | HEART RATE: 84 BPM | SYSTOLIC BLOOD PRESSURE: 141 MMHG

## 2020-06-08 DIAGNOSIS — G89.18 POSTOPERATIVE PAIN: Primary | ICD-10-CM

## 2020-06-08 PROCEDURE — 77030040922 HC BLNKT HYPOTHRM STRY -A: Performed by: ANESTHESIOLOGY

## 2020-06-08 PROCEDURE — 74011250636 HC RX REV CODE- 250/636: Performed by: ANESTHESIOLOGY

## 2020-06-08 PROCEDURE — 77030021052 HC RNG RETRCTR STAY COOP -A: Performed by: OBSTETRICS & GYNECOLOGY

## 2020-06-08 PROCEDURE — 77030031139 HC SUT VCRL2 J&J -A: Performed by: OBSTETRICS & GYNECOLOGY

## 2020-06-08 PROCEDURE — 74011250636 HC RX REV CODE- 250/636: Performed by: OBSTETRICS & GYNECOLOGY

## 2020-06-08 PROCEDURE — 77030002966 HC SUT PDS J&J -A: Performed by: OBSTETRICS & GYNECOLOGY

## 2020-06-08 PROCEDURE — 74011250637 HC RX REV CODE- 250/637: Performed by: ANESTHESIOLOGY

## 2020-06-08 PROCEDURE — 74011250636 HC RX REV CODE- 250/636: Performed by: NURSE ANESTHETIST, CERTIFIED REGISTERED

## 2020-06-08 PROCEDURE — 74011000250 HC RX REV CODE- 250: Performed by: NURSE ANESTHETIST, CERTIFIED REGISTERED

## 2020-06-08 PROCEDURE — 74011000250 HC RX REV CODE- 250: Performed by: ANESTHESIOLOGY

## 2020-06-08 PROCEDURE — 77030008462 HC STPLR SKN PROX J&J -A: Performed by: OBSTETRICS & GYNECOLOGY

## 2020-06-08 PROCEDURE — 77030010509 HC AIRWY LMA MSK TELE -A: Performed by: ANESTHESIOLOGY

## 2020-06-08 PROCEDURE — 76210000031 HC REC RM PH II 4.5 TO 5 HR: Performed by: OBSTETRICS & GYNECOLOGY

## 2020-06-08 PROCEDURE — 76060000034 HC ANESTHESIA 1.5 TO 2 HR: Performed by: OBSTETRICS & GYNECOLOGY

## 2020-06-08 PROCEDURE — 77030002924 HC SUT GORTX WLGO -B: Performed by: OBSTETRICS & GYNECOLOGY

## 2020-06-08 PROCEDURE — 74011000250 HC RX REV CODE- 250: Performed by: OBSTETRICS & GYNECOLOGY

## 2020-06-08 PROCEDURE — 51798 US URINE CAPACITY MEASURE: CPT

## 2020-06-08 PROCEDURE — 77030040361 HC SLV COMPR DVT MDII -B: Performed by: OBSTETRICS & GYNECOLOGY

## 2020-06-08 PROCEDURE — 77030008064 HC RNG RETRCTR COOP -B: Performed by: OBSTETRICS & GYNECOLOGY

## 2020-06-08 PROCEDURE — 77030020991 HC NDL MURPHY ASPN -A: Performed by: OBSTETRICS & GYNECOLOGY

## 2020-06-08 PROCEDURE — 74011250637 HC RX REV CODE- 250/637: Performed by: OBSTETRICS & GYNECOLOGY

## 2020-06-08 PROCEDURE — 77030018836 HC SOL IRR NACL ICUM -A: Performed by: OBSTETRICS & GYNECOLOGY

## 2020-06-08 PROCEDURE — 77030034849: Performed by: OBSTETRICS & GYNECOLOGY

## 2020-06-08 PROCEDURE — 76010000149 HC OR TIME 1 TO 1.5 HR: Performed by: OBSTETRICS & GYNECOLOGY

## 2020-06-08 PROCEDURE — 76210000006 HC OR PH I REC 0.5 TO 1 HR: Performed by: OBSTETRICS & GYNECOLOGY

## 2020-06-08 RX ORDER — SODIUM CHLORIDE, SODIUM LACTATE, POTASSIUM CHLORIDE, CALCIUM CHLORIDE 600; 310; 30; 20 MG/100ML; MG/100ML; MG/100ML; MG/100ML
100 INJECTION, SOLUTION INTRAVENOUS CONTINUOUS
Status: DISCONTINUED | OUTPATIENT
Start: 2020-06-08 | End: 2020-06-08 | Stop reason: HOSPADM

## 2020-06-08 RX ORDER — HALOPERIDOL 5 MG/ML
1 INJECTION INTRAMUSCULAR
Status: COMPLETED | OUTPATIENT
Start: 2020-06-08 | End: 2020-06-08

## 2020-06-08 RX ORDER — NALOXONE HYDROCHLORIDE 0.4 MG/ML
0.1 INJECTION, SOLUTION INTRAMUSCULAR; INTRAVENOUS; SUBCUTANEOUS AS NEEDED
Status: DISCONTINUED | OUTPATIENT
Start: 2020-06-08 | End: 2020-06-08 | Stop reason: HOSPADM

## 2020-06-08 RX ORDER — OXYCODONE HYDROCHLORIDE 5 MG/1
10 TABLET ORAL
Status: DISCONTINUED | OUTPATIENT
Start: 2020-06-08 | End: 2020-06-08 | Stop reason: HOSPADM

## 2020-06-08 RX ORDER — DOCUSATE SODIUM 100 MG/1
100 CAPSULE, LIQUID FILLED ORAL 2 TIMES DAILY
Qty: 60 CAP | Refills: 2 | Status: SHIPPED | OUTPATIENT
Start: 2020-06-08 | End: 2020-09-06

## 2020-06-08 RX ORDER — DIPHENHYDRAMINE HYDROCHLORIDE 50 MG/ML
12.5 INJECTION, SOLUTION INTRAMUSCULAR; INTRAVENOUS
Status: DISCONTINUED | OUTPATIENT
Start: 2020-06-08 | End: 2020-06-08 | Stop reason: HOSPADM

## 2020-06-08 RX ORDER — DEXAMETHASONE SODIUM PHOSPHATE 4 MG/ML
INJECTION, SOLUTION INTRA-ARTICULAR; INTRALESIONAL; INTRAMUSCULAR; INTRAVENOUS; SOFT TISSUE AS NEEDED
Status: DISCONTINUED | OUTPATIENT
Start: 2020-06-08 | End: 2020-06-08 | Stop reason: HOSPADM

## 2020-06-08 RX ORDER — LIDOCAINE HYDROCHLORIDE AND EPINEPHRINE 10; 10 MG/ML; UG/ML
INJECTION, SOLUTION INFILTRATION; PERINEURAL AS NEEDED
Status: DISCONTINUED | OUTPATIENT
Start: 2020-06-08 | End: 2020-06-08 | Stop reason: HOSPADM

## 2020-06-08 RX ORDER — LIDOCAINE HYDROCHLORIDE 10 MG/ML
0.1 INJECTION INFILTRATION; PERINEURAL AS NEEDED
Status: DISCONTINUED | OUTPATIENT
Start: 2020-06-08 | End: 2020-06-08 | Stop reason: HOSPADM

## 2020-06-08 RX ORDER — GABAPENTIN 300 MG/1
300 CAPSULE ORAL ONCE
Status: COMPLETED | OUTPATIENT
Start: 2020-06-08 | End: 2020-06-08

## 2020-06-08 RX ORDER — HEPARIN SODIUM 5000 [USP'U]/ML
5000 INJECTION, SOLUTION INTRAVENOUS; SUBCUTANEOUS EVERY 12 HOURS
Status: DISCONTINUED | OUTPATIENT
Start: 2020-06-08 | End: 2020-06-08 | Stop reason: HOSPADM

## 2020-06-08 RX ORDER — CELECOXIB 200 MG/1
400 CAPSULE ORAL ONCE
Status: COMPLETED | OUTPATIENT
Start: 2020-06-08 | End: 2020-06-08

## 2020-06-08 RX ORDER — FENTANYL CITRATE 50 UG/ML
INJECTION, SOLUTION INTRAMUSCULAR; INTRAVENOUS AS NEEDED
Status: DISCONTINUED | OUTPATIENT
Start: 2020-06-08 | End: 2020-06-08 | Stop reason: HOSPADM

## 2020-06-08 RX ORDER — ACETAMINOPHEN 500 MG
1000 TABLET ORAL ONCE
Status: COMPLETED | OUTPATIENT
Start: 2020-06-08 | End: 2020-06-08

## 2020-06-08 RX ORDER — ALBUTEROL SULFATE 0.83 MG/ML
2.5 SOLUTION RESPIRATORY (INHALATION) AS NEEDED
Status: DISCONTINUED | OUTPATIENT
Start: 2020-06-08 | End: 2020-06-08 | Stop reason: HOSPADM

## 2020-06-08 RX ORDER — HYDROMORPHONE HYDROCHLORIDE 2 MG/ML
0.5 INJECTION, SOLUTION INTRAMUSCULAR; INTRAVENOUS; SUBCUTANEOUS
Status: DISCONTINUED | OUTPATIENT
Start: 2020-06-08 | End: 2020-06-08 | Stop reason: HOSPADM

## 2020-06-08 RX ORDER — PROPOFOL 10 MG/ML
INJECTION, EMULSION INTRAVENOUS AS NEEDED
Status: DISCONTINUED | OUTPATIENT
Start: 2020-06-08 | End: 2020-06-08 | Stop reason: HOSPADM

## 2020-06-08 RX ORDER — CEFAZOLIN SODIUM/WATER 2 G/20 ML
2 SYRINGE (ML) INTRAVENOUS ONCE
Status: COMPLETED | OUTPATIENT
Start: 2020-06-08 | End: 2020-06-08

## 2020-06-08 RX ORDER — ONDANSETRON 2 MG/ML
4 INJECTION INTRAMUSCULAR; INTRAVENOUS ONCE
Status: DISCONTINUED | OUTPATIENT
Start: 2020-06-08 | End: 2020-06-08 | Stop reason: HOSPADM

## 2020-06-08 RX ORDER — OXYCODONE HYDROCHLORIDE 5 MG/1
5 TABLET ORAL
Status: DISCONTINUED | OUTPATIENT
Start: 2020-06-08 | End: 2020-06-08 | Stop reason: HOSPADM

## 2020-06-08 RX ORDER — KETAMINE HYDROCHLORIDE 50 MG/ML
INJECTION, SOLUTION INTRAMUSCULAR; INTRAVENOUS AS NEEDED
Status: DISCONTINUED | OUTPATIENT
Start: 2020-06-08 | End: 2020-06-08 | Stop reason: HOSPADM

## 2020-06-08 RX ORDER — ONDANSETRON 2 MG/ML
INJECTION INTRAMUSCULAR; INTRAVENOUS AS NEEDED
Status: DISCONTINUED | OUTPATIENT
Start: 2020-06-08 | End: 2020-06-08 | Stop reason: HOSPADM

## 2020-06-08 RX ORDER — HYDROMORPHONE HYDROCHLORIDE 2 MG/1
2 TABLET ORAL
Qty: 10 TAB | Refills: 0 | Status: SHIPPED | OUTPATIENT
Start: 2020-06-08 | End: 2020-06-11

## 2020-06-08 RX ORDER — LIDOCAINE HYDROCHLORIDE 20 MG/ML
INJECTION, SOLUTION EPIDURAL; INFILTRATION; INTRACAUDAL; PERINEURAL AS NEEDED
Status: DISCONTINUED | OUTPATIENT
Start: 2020-06-08 | End: 2020-06-08 | Stop reason: HOSPADM

## 2020-06-08 RX ORDER — POLYETHYLENE GLYCOL 3350 17 G/17G
17 POWDER, FOR SOLUTION ORAL DAILY
Qty: 30 PACKET | Refills: 3 | Status: SHIPPED | OUTPATIENT
Start: 2020-06-08

## 2020-06-08 RX ORDER — MIDAZOLAM HYDROCHLORIDE 1 MG/ML
2 INJECTION, SOLUTION INTRAMUSCULAR; INTRAVENOUS
Status: COMPLETED | OUTPATIENT
Start: 2020-06-08 | End: 2020-06-08

## 2020-06-08 RX ADMIN — GABAPENTIN 300 MG: 300 CAPSULE ORAL at 06:15

## 2020-06-08 RX ADMIN — SODIUM CHLORIDE, SODIUM LACTATE, POTASSIUM CHLORIDE, AND CALCIUM CHLORIDE: 600; 310; 30; 20 INJECTION, SOLUTION INTRAVENOUS at 08:13

## 2020-06-08 RX ADMIN — MIDAZOLAM 2 MG: 1 INJECTION INTRAMUSCULAR; INTRAVENOUS at 06:42

## 2020-06-08 RX ADMIN — CELECOXIB 400 MG: 200 CAPSULE ORAL at 06:15

## 2020-06-08 RX ADMIN — HALOPERIDOL LACTATE 1 MG: 5 INJECTION, SOLUTION INTRAMUSCULAR at 09:25

## 2020-06-08 RX ADMIN — Medication 2 G: at 07:16

## 2020-06-08 RX ADMIN — LIDOCAINE HYDROCHLORIDE 100 MG: 20 INJECTION, SOLUTION EPIDURAL; INFILTRATION; INTRACAUDAL; PERINEURAL at 07:10

## 2020-06-08 RX ADMIN — DEXAMETHASONE SODIUM PHOSPHATE 10 MG: 4 INJECTION, SOLUTION INTRAMUSCULAR; INTRAVENOUS at 07:22

## 2020-06-08 RX ADMIN — FENTANYL CITRATE 25 MCG: 50 INJECTION INTRAMUSCULAR; INTRAVENOUS at 07:45

## 2020-06-08 RX ADMIN — ACETAMINOPHEN 1000 MG: 500 TABLET, FILM COATED ORAL at 06:15

## 2020-06-08 RX ADMIN — KETAMINE HYDROCHLORIDE 40 MG: 50 INJECTION INTRAMUSCULAR; INTRAVENOUS at 07:27

## 2020-06-08 RX ADMIN — LIDOCAINE HYDROCHLORIDE 0.1 ML: 10 INJECTION, SOLUTION INFILTRATION; PERINEURAL at 06:02

## 2020-06-08 RX ADMIN — FENTANYL CITRATE 25 MCG: 50 INJECTION INTRAMUSCULAR; INTRAVENOUS at 07:27

## 2020-06-08 RX ADMIN — SODIUM CHLORIDE, SODIUM LACTATE, POTASSIUM CHLORIDE, AND CALCIUM CHLORIDE 100 ML/HR: 600; 310; 30; 20 INJECTION, SOLUTION INTRAVENOUS at 06:02

## 2020-06-08 RX ADMIN — KETAMINE HYDROCHLORIDE 10 MG: 50 INJECTION INTRAMUSCULAR; INTRAVENOUS at 08:09

## 2020-06-08 RX ADMIN — FENTANYL CITRATE 50 MCG: 50 INJECTION INTRAMUSCULAR; INTRAVENOUS at 07:10

## 2020-06-08 RX ADMIN — PROPOFOL 150 MG: 10 INJECTION, EMULSION INTRAVENOUS at 07:10

## 2020-06-08 RX ADMIN — ONDANSETRON 4 MG: 2 INJECTION INTRAMUSCULAR; INTRAVENOUS at 08:09

## 2020-06-08 RX ADMIN — HEPARIN SODIUM 5000 UNITS: 5000 INJECTION INTRAVENOUS; SUBCUTANEOUS at 06:17

## 2020-06-08 NOTE — PERIOP NOTES
Patient instructed to drink fluids in order to help her urinate, educated that she must urinate before going home. Patient given water and gingerale, states she doesn't want anything to drink. Left drinks at bedside, encouraged to drink.

## 2020-06-08 NOTE — OP NOTES
NAMES OF PROCEDURES:   1. Extraperitoneal vaginal colpopexy using the sacrospinous ligaments bilaterally  2. Posterior Repair  3. Enterocele Repair  4. Perineorrhaphy     PREOPERATIVE DIAGNOSES: Vaginal Vault prolapse, Rectocele, Perineal defect     POSTOPERATIVE DIAGNOSIS: Vaginal Vault Prolapse, Rectocele, Enterocele,  Perineal defect    Heidi Choudhary MD     EBL: 200cc     IVF: 1200cc     URINE OUTPUT: 300cc     Specimen: None     Complications: none       FINDINGS:  Normal rectal exam. No masses, lesions, or suture in the rectum     Indications for procedure: This is a 63year old female with symptomatic prolapse. She has elected to have this surgically corrected after being extensively counseled on the risks, benefits, indications and alternatives of the procedure. Risks reviewed include bleeding, infection, damage to the bladder, ureters, urethra, bowel, blood vessels, nerves, postoperative urinary retention, postoperative incontinence, pelvic pain, dyspareunia, recurrence, mesh erosion, anesthetic complications and death. The patient expressed understanding and informed consent was obtained. DESCRIPTION OF PROCEDURE: The patient was brought to the operating room, where she was placed in the supine position. Once in the supine position, she was placed under general anesthesia with an endotracheal tube. She was then placed in the dorsal lithotomy position with the Yellofins stirrups, making sure that her ankles, knees and hips were in alignment toward the contralateral shoulders. She was prepped and draped in normal sterile fashion. A 16-Macedonian Lea catheter was placed in the patient's bladder. Lone Star retractor was placed on the patient's pelvis, with hooks along the vaginal epithelium. Two Allis clamps were placed along the posterior fourchette just off the midline. A third Allis clamp was placed in the posterior wall of the vagina approximately one-third of the way up the posterior wall. Using a scalpel, a candis-shaped piece of perineal skin was cut between the 2 Allis clamps. Using the metzenbaum scissors, a midline incision was made up to the level of the vaginal cuff. Using the scissors, the bulbocavernosus and the superficial transverse perineal muscles were dissected in a lateral fashion. The vaginal epithelium was dissected off the rectovaginal fascia. Using my finger, dissection was carried through into the space of Retzius, palpating the ischial spine to the arcus tendineus fascia pelvis and the sacrospinous ligaments on the patients right. Using the Capio device, a CV2 Doniphan-Fito suture was placed through the right sacrospinous ligament x2. These sutures were placed approximately 2cm away from the ischial spines. Once this was done, a rectal exam was performed. A clear enterocele was present. The sac was identified, isolated and purse-string closed in two layers using 2-0 PDS suture. This rediced the enterocele to normal anatomic position. The posterior wall defect was then identified. The superior aspect of the rectovaginal fascia had been detached from the scarred vaginal cuff. A 2-0 PDS was also used to attach the rectovaginal tissue back to the vaginal cuff. Then the remaining fascia was plicated  in the midline using 2-0 PDS in running fashion. Then a site specific repair was performed in the upper left. Next, using a free Vásquez needle, the 2 sacrospinous sutures were placed through the apex of the vagina, making sure not to go through-and-through the vaginal epithelium. Once this was done, the area was irrigated with sterile water. The 2 sutures were then tied into place, bringing the apex of the vagina to its proper location and the anterior compartment to its proper location. Once this was done, the area was irrigated with sterile water.  Once this was done, then using 2-0 PDS suture, the lower bulbocavernosus and the superficial transverse perineal muscles were plicated in the midline in interrupted fashion x3. Once this was done, then using 2-0 Vicryl suture, the posterior wall was closed in a running fashion. The same suture was used to close the perineal skin in subcuticular fashion. The alvarez catheter was removed. Sponge, lap and needle counts were correct x2. The patient was then awakened and brought to the PACU in a stable condition.

## 2020-06-08 NOTE — ANESTHESIA PREPROCEDURE EVALUATION
Relevant Problems   No relevant active problems       Anesthetic History               Review of Systems / Medical History  Patient summary reviewed, nursing notes reviewed and pertinent labs reviewed    Pulmonary                   Neuro/Psych              Cardiovascular                  Exercise tolerance: >4 METS     GI/Hepatic/Renal     GERD: well controlled      Hiatal hernia     Endo/Other      Hypothyroidism: well controlled       Other Findings              Physical Exam    Airway  Mallampati: II  TM Distance: 4 - 6 cm  Neck ROM: normal range of motion   Mouth opening: Normal     Cardiovascular  Regular rate and rhythm,  S1 and S2 normal,  no murmur, click, rub, or gallop             Dental  No notable dental hx       Pulmonary  Breath sounds clear to auscultation               Abdominal         Other Findings            Anesthetic Plan    ASA: 2  Anesthesia type: general          Induction: Intravenous  Anesthetic plan and risks discussed with: Patient

## 2020-06-08 NOTE — DISCHARGE INSTRUCTIONS
Caring for yourself after Vaginal Prolapse Repair or Sling      General care: You will be sore and it will take time to heal after surgery; as a rule, you will gradually get better and need less pain medication on a daily basis. Diet: Start with easy, bland foods and resume your regular diet as tolerated. Avoid foods that are greasy or give you gas. Keep well hydrated, drinking at least 8 glasses of fluids a day. Do not drink any alcohol while taking narcotic pain medications. Activity: You should take short walks frequently after surgery, but no strenuous activity. You may climb stairs, slowly and carefully. In addition to the surgical reconstruction you just underwent, what you do or should we say dont do is as important in the success of your repair. We recommend no heavy housework for the first 6-8 weeks. No lifting greater than 5-10 pounds for at least 6 weeks, although the more heavy lifting you do in general can compromise our repair. You may drive in general in about 1-2 weeks. DO NOT DRIVE WHILE TAKING NARCOTIC MEDICAITION. Also, do not drive until you are moving (standing, sitting, walking) easily without pain or hesitation. You may use your stairs at home after discharge, increasing as the days go on. This is all designed with the theory of reducing the amount of abdominal straining which can lead to a longer lasting surgical correction. No bathing or swimming but you may shower. Listen to your body. Guille Hilt if you are receiving any signals that you are doing too much (pain, pulling, bleeding), then stop doing it!! Wound care: You may shower after your surgery. Your incisions are in the vagina. You may have small incisions in the fold of your thigh or just above your pubic bone. These are closed with sterile skin glue and will feel like a scab. They will fall off in time. Do not pick at them. You may also have sutures between your rectum and vagina. These sutures will dissolve on their own.  If there is any irritation, swelling, or worsening redness of your surgical wounds, please call the office. You may use ice packs on and off (no more than 20 minutes on at a time) for the first 72 hours. You will have some vaginal bleeding tapering off to spotting for up to several weeks. If it becomes heavier than you would have expected please give us a call. Other limitations: Do not put anything in the vagina (do not have sex, douche, or use tampons) until cleared by your doctor. Do not soak in a bath, pool, hot tub, or the ocean for at least 2 weeks or until your doctor says it is okay. When to call your doctor: If you develop a fever >101, chills, nausea, vomiting, problems urinating, increasing abdominal pain, or concerns with your incisions, call your doctor. If you experience heavy vaginal bleeding or large blood clots, call your doctor; light spotting after surgery is normal and is a sign of healing, even 3-4 weeks after surgery. If you have swelling, redness or pain in your legs, call your doctor. If you have trouble breathing, chest pain, or any other emergency, you should come in to our Emergency triage area and/or call 911. If, at any time, you have questions or concerns, worsening pain or belly pain, you should feel free to call your doctor. Pain control: Continue narcotic pain medications as prescribed by your doctor as needed. Ibuprofen (Advil, Motrin) should be the main medication you use for pain control unless otherwise instructed by your doctor. Take ibuprofen with food, and do not take more than is recommended or prescribed. Please use caution if taking extra Acetaminophen (Tylenol) as most narcotic medications already contain this, and taking too much is dangerous. Please call our office or talk to your pharmacist if you have any questions. Bowel Regimen: You may not have a bowel movement for several days post operatively.  Please keep bowl movements soft by adding extra fiber to your diet and drinking plenty of fluids. Please supplement that with:    o Colace 100MG- one tablet 2 times per day (other stool softener is acceptable)  o Miralax powder (laxative) - one tablespoon dissolved in 8 ounce of water of juice every day    If you are unable to have a bowel movement by the 4th or 5th day after your operation, please try some Milk of magnesium or alternatively you may call the office. Once bowel movements are regular and easy, you may gradually wean yourself off of the laxatives and stool softeners. Follow-up appointment: Please call the office for an appointment to see your doctors approximately four weeks after your surgery. Please call the office sooner if you have any questions or concerns. Catheter care (if applicable): if you have had difficulty voiding after the surgery or if your doctor feels it is best you may be sent home with a catheter. If so, rest assured that this is a short term problem. You will be given a leg bad and a large overnight bag on discharge and the appropriate education to care for both catheters. You may shower with the catheter in place. Please call the office to schedule a time to come in within the next few days to have the catheter removed. After general anesthesia or intravenous sedation, for 24 hours or while taking prescription Narcotics:  · Limit your activities  · A responsible adult needs to be with you for the next 24 hours  · Do not drive and operate hazardous machinery  · Do not make important personal or business decisions  · Do not drink alcoholic beverages  · If you have not urinated within 8 hours after discharge, please contact your surgeon on call. · If you have sleep apnea and have a CPAP machine, please use it for all naps and sleeping. · Please use caution when taking narcotics and any of your home medications that may cause drowsiness. *  Please give a list of your current medications to your Primary Care Provider.   *  Please update this list whenever your medications are discontinued, doses are      changed, or new medications (including over-the-counter products) are added. *  Please carry medication information at all times in case of emergency situations. These are general instructions for a healthy lifestyle:  No smoking/ No tobacco products/ Avoid exposure to second hand smoke  Surgeon General's Warning:  Quitting smoking now greatly reduces serious risk to your health. Obesity, smoking, and sedentary lifestyle greatly increases your risk for illness  A healthy diet, regular physical exercise & weight monitoring are important for maintaining a healthy lifestyle    You may be retaining fluid if you have a history of heart failure or if you experience any of the following symptoms:  Weight gain of 3 pounds or more overnight or 5 pounds in a week, increased swelling in our hands or feet or shortness of breath while lying flat in bed. Please call your doctor as soon as you notice any of these symptoms; do not wait until your next office visit.

## 2020-06-08 NOTE — PERIOP NOTES
Per Dr. Carolann Villa, performed bladder scan on pt, reading of 326ml, per Dr. Carolann Villa, alvarez catheter to be placed, pt instructed to wait for phone call from Dr. Barone Peak office tomorrow for alvarez removal.

## 2020-06-08 NOTE — PERIOP NOTES
Patient on toilet for roughly 15 minutes. Unable to void. Bladder scan resulted 108 cc urine in bladder.

## 2020-06-08 NOTE — PERIOP NOTES
PT discharged with alvarez in place, 350 ml drained out of bladder, pt states full understanding of waiting for Dr. Sangeeta Shelton office to reach out tomorrow to discuss removal.

## 2020-06-08 NOTE — ANESTHESIA POSTPROCEDURE EVALUATION
Procedure(s):  POSTERIOR REPAIR/ PERINEORRHAPHY, SSLF(sacrospirous ligament fixation. Schuyler Reagan general    Anesthesia Post Evaluation      Multimodal analgesia: multimodal analgesia used between 6 hours prior to anesthesia start to PACU discharge  Patient location during evaluation: bedside  Patient participation: complete - patient participated  Level of consciousness: awake and alert  Pain score: 1  Pain management: adequate  Airway patency: patent  Anesthetic complications: no  Cardiovascular status: acceptable  Respiratory status: acceptable  Hydration status: acceptable  Comments: Pt doing well. Ok to d/c home.    Post anesthesia nausea and vomiting:  none  Final Post Anesthesia Temperature Assessment:  Normothermia (36.0-37.5 degrees C)      INITIAL Post-op Vital signs:   Vitals Value Taken Time   /76 6/8/2020  9:10 AM   Temp 36.7 °C (98.1 °F) 6/8/2020  8:36 AM   Pulse 84 6/8/2020  9:10 AM   Resp 16 6/8/2020  9:10 AM   SpO2 92 % 6/8/2020  9:10 AM

## 2020-06-10 PROBLEM — R33.8 POSTOPERATIVE RETENTION OF URINE: Status: ACTIVE | Noted: 2020-06-10

## 2020-06-10 PROBLEM — N99.89 POSTOPERATIVE RETENTION OF URINE: Status: ACTIVE | Noted: 2020-06-10

## 2020-07-06 PROBLEM — R33.8 POSTOPERATIVE RETENTION OF URINE: Status: RESOLVED | Noted: 2020-06-10 | Resolved: 2020-07-06

## 2020-07-06 PROBLEM — N99.89 POSTOPERATIVE RETENTION OF URINE: Status: RESOLVED | Noted: 2020-06-10 | Resolved: 2020-07-06

## 2022-03-19 PROBLEM — N81.10 VAGINAL PROLAPSE: Status: ACTIVE | Noted: 2020-05-05

## 2022-03-19 PROBLEM — N32.81 OAB (OVERACTIVE BLADDER): Status: ACTIVE | Noted: 2019-08-07

## 2022-03-19 PROBLEM — N95.0 POSTMENOPAUSAL BLEEDING: Status: ACTIVE | Noted: 2017-07-03

## 2022-03-20 PROBLEM — K64.9 HEMORRHOIDS: Status: ACTIVE | Noted: 2017-06-01

## 2022-03-20 PROBLEM — N81.6 RECTOCELE: Status: ACTIVE | Noted: 2019-08-07

## 2022-11-08 ENCOUNTER — OFFICE VISIT (OUTPATIENT)
Dept: GYNECOLOGY | Age: 62
End: 2022-11-08
Payer: COMMERCIAL

## 2022-11-08 VITALS
WEIGHT: 194 LBS | HEIGHT: 64 IN | SYSTOLIC BLOOD PRESSURE: 162 MMHG | BODY MASS INDEX: 33.12 KG/M2 | DIASTOLIC BLOOD PRESSURE: 88 MMHG

## 2022-11-08 DIAGNOSIS — N95.2 VAGINAL ATROPHY: ICD-10-CM

## 2022-11-08 DIAGNOSIS — Z12.4 SCREENING FOR MALIGNANT NEOPLASM OF CERVIX: ICD-10-CM

## 2022-11-08 DIAGNOSIS — Z12.31 VISIT FOR SCREENING MAMMOGRAM: ICD-10-CM

## 2022-11-08 DIAGNOSIS — Z01.419 WELL WOMAN EXAM: Primary | ICD-10-CM

## 2022-11-08 PROCEDURE — 99396 PREV VISIT EST AGE 40-64: CPT | Performed by: OBSTETRICS & GYNECOLOGY

## 2022-11-08 RX ORDER — MAGNESIUM OXIDE 400 MG/1
1 TABLET ORAL DAILY
COMMUNITY

## 2022-11-08 RX ORDER — CONJUGATED ESTROGENS 0.62 MG/G
CREAM VAGINAL
Qty: 30 G | Refills: 3 | Status: SHIPPED | OUTPATIENT
Start: 2022-11-08

## 2022-11-08 NOTE — PROGRESS NOTES
sling    WRIST FRACTURE SURGERY Left     Ganglion     Family History   Problem Relation Age of Onset    Emphysema Father     Heart Disease Father       Social History     Tobacco Use    Smoking status: Never    Smokeless tobacco: Never   Substance Use Topics    Alcohol use: Yes     Comment: occ       Social History     Substance and Sexual Activity   Sexual Activity Yes    Partners: Male    Birth control/protection: None     OB History    Para Term  AB Living   4 1 0 0 3 1   SAB IAB Ectopic Molar Multiple Live Births   3 0 0 0 0 0      # Outcome Date GA Lbr Tonny/2nd Weight Sex Delivery Anes PTL Lv   4 SAB            3 SAB            2 SAB            1 Para                Health Maintenance  Mammogram: 21 add views left 10-1-21   Colonoscopy:   Bone Density:  Pap smear:       Review of Systems  General: Not Present- Chills, Fever, Fatigue, Insomnia, Hot flashes/Night sweats, Weight gain  Skin: Not Present- Bruising, Change in Wart/Mole, Excessive Sweating, Itching, Nail Changes, New Lesions, Rash, Skin Color Changes and Ulcer. HEENT: Not Present- Headache, Blurred Vision, Double Vision, Glaucoma, Visual Disturbances, Hearing Loss, Ringing in the Ears, Vertigo, Nose Bleed, Bleeding Gums, Hoarseness and Sore Throat. Neck: Not Present- Neck Pain and Neck Swelling. Respiratory: Not Present- Cough, Difficulty Breathing and Difficulty Breathing on Exertion. Breast: Not Present- Breast Mass, Breast Pain, Breast Swelling, Nipple Discharge, Nipple Pain, Recent Breast Size Changes and Skin Changes. Cardiovascular: Not Present- Abnormal Blood Pressure, Chest Pain, Edema, Fainting / Blacking Out, Palpitations, Shortness of Breath and Swelling of Extremities. Gastrointestinal: Not Present- Abdominal Pain, Abdominal Swelling, Bloating, Change in Bowel Habits, Constipation, Diarrhea, Difficulty Swallowing, Gets full quickly at meals, Nausea, Rectal Bleeding and Vomiting.   Female Genitourinary: Not Present- Dysmenorrhea, Dyspareunia, Decreased libido, Excessive Menstrual Bleeding, Menstrual Irregularities, Pelvic Pain, Urinary Complaints, Vaginal Discharge, Vaginal itching/burning, Vaginal odor  Musculoskeletal: Not Present- Joint Pain and Muscle Pain. Neurological: Not Present- Dizziness, Fainting, Headaches and Seizures. Psychiatric: Not Present- Anxiety, Depression, Mood changes and Panic Attacks. Endocrine: Not Present- Appetite Changes, Cold Intolerance, Excessive Thirst, Excessive Urination and Heat Intolerance. Hematology: Not Present- Abnormal Bleeding, Easy Bruising and Enlarged Lymph Nodes. PHYSICAL EXAM:     BP (!) 162/88   Ht 5' 4\" (1.626 m)   Wt 194 lb (88 kg)   BMI 33.30 kg/m²     Physical Exam   General   Mental Status - Alert. General Appearance - Cooperative. Integumentary   General Characteristics: Overall examination of the patient's skin reveals - no rashes and no suspicious lesions. Head and Neck  Head - normocephalic, atraumatic with no lesions or palpable masses. Neck Note: Normal   Thyroid   Gland Characteristics - normal size and consistency and no palpable nodules. Chest and Lung Exam   Chest and lung exam reveals - on auscultation, normal breath sounds, no adventitious sounds and normal vocal resonance. Breast   Breast - Left - Normal. Right - Normal.     Cardiovascular   Cardiovascular examination reveals - normal heart sounds, regular rate and rhythm with no murmurs. Abdomen   Inspection: - Inspection Normal.   Palpation/Percussion: Palpation and Percussion of the abdomen reveal - Non Tender, No Rebound tenderness, No Rigidity (guarding), No hepatosplenomegaly, No Palpable abdominal masses and Soft.    Auscultation: Auscultation of the abdomen reveals - Bowel sounds normal.     Female Genitourinary     External Genitalia   Vulva: - Normal. Perineum - Normal. Bartholin's Gland - Bilateral - Normal. Clitoris - Normal.   Introitus: Characteristics - Normal.   Urethra: Characteristics - Normal.     Speculum & Bimanual   Vagina: Vaginal Mucosa - Normal.   Vaginal Wall: - Normal.   Vaginal Lesions - None. Cervix: Characteristics - Normal.   Uterus: Characteristics - Normal.   Adnexa: - Normal.   Bladder - Normal.     Peripheral Vascular   Normal    Neuropsychiatric   Examination of related systems reveals - The patient is well-nourished and well-groomed. Mental status exam performed with findings of - Oriented X3 with appropriate mood and affect. Musculoskeletal  Normal      General Lymphatics  Normal           Medical problems and test results were reviewed with the patient today. ASSESSMENT and PLAN    1. Well woman exam  2. Screening for malignant neoplasm of cervix  -     PAP LB, Reflex HPV ASCUS  3. Visit for screening mammogram  -     [facility] JOANNA DIGITAL SCREEN BILATERAL; Future  4. Vaginal atrophy  -     estrogens conjugated (PREMARIN) 0.625 MG/GM CREA vaginal cream; Use 1/2 g vaginally 3 nights per week, Disp-30 g, R-3, Normal         No follow-ups on file.        Susie Smith MD  11/8/2022

## 2022-11-11 LAB
CYTOLOGIST CVX/VAG CYTO: NORMAL
CYTOLOGY CVX/VAG DOC THIN PREP: NORMAL
HPV REFLEX: NORMAL
Lab: NORMAL
PATH REPORT.FINAL DX SPEC: NORMAL
STAT OF ADQ CVX/VAG CYTO-IMP: NORMAL

## 2024-11-01 RX ORDER — METHYLDOPA/HYDROCHLOROTHIAZIDE 250MG-15MG
1 TABLET ORAL DAILY
COMMUNITY

## 2024-11-01 RX ORDER — ESTRADIOL 1 MG/1
1 TABLET ORAL
COMMUNITY

## 2024-11-01 NOTE — PERIOP NOTE
Patient verified name and .  Order for consent not found in EHR and matches case posting; patient verifies procedure.   Type 2 surgery, phone assessment complete.  Orders not received.  Labs per surgeon: None  Labs per anesthesia protocol: HGB (DOS)    Patient answered medical/surgical history questions at their best of ability. All prior to admission medications documented in EPIC.    Patient instructed to continue taking all prescription medications up to the day of surgery but to take only the following medications the day of surgery according to anesthesia guidelines with a small sip of water:     Esomeprazole (Nexium)       Also, patient is requested to take 2 Tylenol in the morning and then again before bed on the day before surgery. Regular or extra strength may be used.       Patient informed that all vitamins and supplements should be held 7 days prior to surgery and NSAIDS 5 days prior to surgery. Prescription meds to hold:none    Patient instructed on the following:  Please drink 32 ounces of non-caffeinated clear liquids 2 hours prior to your arrival to avoid dehydration.    > Arrive at MAIN Entrance, time of arrival to be called the day before by 1700  > NPO after midnight, unless otherwise indicated, including gum, mints, and ice chips  > Responsible adult must drive patient to the hospital, stay during surgery, and patient will need supervision 24 hours after anesthesia  > Use non moisturizing soap in shower the night before surgery and on the morning of surgery  > All piercings must be removed prior to arrival.    > Leave all valuables (money and jewelry) at home but bring insurance card and ID on DOS.   > You may be required to pay a deductible or co-pay on the day of your procedure. You can pre-pay by calling [phone] if your surgery is at the [facility] or [phone] if your surgery is at the [facility].  > Do not wear make-up, nail polish, lotions, cologne, perfumes, powders, or oil

## 2024-11-11 ENCOUNTER — PREP FOR PROCEDURE (OUTPATIENT)
Dept: SURGERY | Age: 64
End: 2024-11-11

## 2024-11-11 RX ORDER — SODIUM CHLORIDE 0.9 % (FLUSH) 0.9 %
5-40 SYRINGE (ML) INJECTION PRN
Status: CANCELLED | OUTPATIENT
Start: 2024-11-11

## 2024-11-11 RX ORDER — SODIUM CHLORIDE 9 MG/ML
INJECTION, SOLUTION INTRAVENOUS PRN
Status: CANCELLED | OUTPATIENT
Start: 2024-11-11

## 2024-11-11 RX ORDER — SODIUM CHLORIDE 0.9 % (FLUSH) 0.9 %
5-40 SYRINGE (ML) INJECTION EVERY 12 HOURS SCHEDULED
Status: CANCELLED | OUTPATIENT
Start: 2024-11-11

## 2024-11-11 NOTE — H&P
Patient Information-  Toni Ray   : 1960   Scheduled Services for : Elizabeth Rothman Blakemore   Appt Purpose: Breast Reduction Appt Notes: *SFM*   COLLECT $502.19 + Acct Balance   SURG    Referral Source: [facility]   Occupation: School Nurse   Insurance: [employer] Plan:   Surgery date: 2024 Breast Reduction - Bilateral Dr Shauna DUENAS   Medications / Allergies  :  List any allergies:  List any current medications (Please include over-the-counter medications)      Drug Dosage Prescribed by   1. Nexium 20 mg Erendira Molter   2. Rosuvastatin 20 mg Erendira Molter   3. Estradiol Vaginal Tablets     4. Ibuprofen 600 mg OTC   5. Acetaminophen 1000 mg OTC   Non-steroidal anti-inflammatory drugs (ex. Motrin, Aleve)  Do you take vitamins/minerals?      Yes If yes, what kind?     CoQ10, Magnesium, Folic Acid, Fish Oil, D3 5000iu, K2, Culturelle Pre/Probiotic   Current Medications  Name Sig Start Date Discontinue Date   cefadroxil 500 mg capsule 1 capsule by mouth BID 2024    cyclobenzaprine 10 mg tablet 1 tablet by mouth Q6-8h 2024    ondansetron 8 mg disintegrating tablet 1 tablet by mouth as directed Dissolve one tablet under tongue every 4-6 hours as needed for post-operative nausea. 2024    scopolamine 1.5 mg transdermal patch (1 mg over 3 days) 1 patch on the skin as directed Apply behind ear or under upper arm night before surgery. 2024    Height / Weight / BMI:  Height/Weight/BMI  Her height is 5ft 6in and weight is 186lbs. BMI is 30.02.   Social / Family History:  ~MUS2 - Smoking Status  Never smoker.   Social History  Patient denies using illegal drugs, denies high risk factors and admits alochol use socially.   Family History  Autoimmune Disorders Mother Myelodysplastic Syndrome   Breast Cancer Paternal Aunt   Cancer Mother Leukemia   Heart Disease Father Heart Attack &amp; CABG   High Blood Pressure Father   Skin Cancer Father  Vital

## 2024-11-13 ENCOUNTER — ANESTHESIA EVENT (OUTPATIENT)
Dept: SURGERY | Age: 64
End: 2024-11-13
Payer: COMMERCIAL

## 2024-11-14 ENCOUNTER — ANESTHESIA (OUTPATIENT)
Dept: SURGERY | Age: 64
End: 2024-11-14
Payer: COMMERCIAL

## 2024-11-14 ENCOUNTER — HOSPITAL ENCOUNTER (OUTPATIENT)
Age: 64
Setting detail: OUTPATIENT SURGERY
Discharge: HOME | End: 2024-11-14
Attending: PLASTIC SURGERY | Admitting: PLASTIC SURGERY
Payer: COMMERCIAL

## 2024-11-14 VITALS
DIASTOLIC BLOOD PRESSURE: 61 MMHG | HEART RATE: 71 BPM | WEIGHT: 186 LBS | OXYGEN SATURATION: 97 % | SYSTOLIC BLOOD PRESSURE: 116 MMHG | BODY MASS INDEX: 29.89 KG/M2 | RESPIRATION RATE: 14 BRPM | HEIGHT: 66 IN | TEMPERATURE: 98.2 F

## 2024-11-14 LAB — HGB BLD-MCNC: 13.1 G/DL (ref 11.7–15.4)

## 2024-11-14 PROCEDURE — L8000 MASTECTOMY BRA: HCPCS | Performed by: PLASTIC SURGERY

## 2024-11-14 PROCEDURE — 2709999900 HC NON-CHARGEABLE SUPPLY: Performed by: PLASTIC SURGERY

## 2024-11-14 PROCEDURE — 3700000001 HC ADD 15 MINUTES (ANESTHESIA): Performed by: PLASTIC SURGERY

## 2024-11-14 PROCEDURE — 7100000010 HC PHASE II RECOVERY - FIRST 15 MIN: Performed by: PLASTIC SURGERY

## 2024-11-14 PROCEDURE — 6360000002 HC RX W HCPCS: Performed by: PLASTIC SURGERY

## 2024-11-14 PROCEDURE — 3600000012 HC SURGERY LEVEL 2 ADDTL 15MIN: Performed by: PLASTIC SURGERY

## 2024-11-14 PROCEDURE — C2626 INFUSION PUMP, NON-PROG,TEMP: HCPCS | Performed by: PLASTIC SURGERY

## 2024-11-14 PROCEDURE — 2580000003 HC RX 258: Performed by: PLASTIC SURGERY

## 2024-11-14 PROCEDURE — 2580000003 HC RX 258

## 2024-11-14 PROCEDURE — 7100000011 HC PHASE II RECOVERY - ADDTL 15 MIN: Performed by: PLASTIC SURGERY

## 2024-11-14 PROCEDURE — 6360000002 HC RX W HCPCS: Performed by: ANESTHESIOLOGY

## 2024-11-14 PROCEDURE — 2500000003 HC RX 250 WO HCPCS

## 2024-11-14 PROCEDURE — 6360000002 HC RX W HCPCS

## 2024-11-14 PROCEDURE — 3700000000 HC ANESTHESIA ATTENDED CARE: Performed by: PLASTIC SURGERY

## 2024-11-14 PROCEDURE — 88305 TISSUE EXAM BY PATHOLOGIST: CPT

## 2024-11-14 PROCEDURE — 2500000003 HC RX 250 WO HCPCS: Performed by: PLASTIC SURGERY

## 2024-11-14 PROCEDURE — 85018 HEMOGLOBIN: CPT

## 2024-11-14 PROCEDURE — 3600000002 HC SURGERY LEVEL 2 BASE: Performed by: PLASTIC SURGERY

## 2024-11-14 PROCEDURE — 7100000000 HC PACU RECOVERY - FIRST 15 MIN: Performed by: PLASTIC SURGERY

## 2024-11-14 PROCEDURE — 6370000000 HC RX 637 (ALT 250 FOR IP): Performed by: ANESTHESIOLOGY

## 2024-11-14 PROCEDURE — 7100000001 HC PACU RECOVERY - ADDTL 15 MIN: Performed by: PLASTIC SURGERY

## 2024-11-14 PROCEDURE — 2580000003 HC RX 258: Performed by: ANESTHESIOLOGY

## 2024-11-14 RX ORDER — HYDROMORPHONE HYDROCHLORIDE 2 MG/ML
0.5 INJECTION, SOLUTION INTRAMUSCULAR; INTRAVENOUS; SUBCUTANEOUS EVERY 5 MIN PRN
Status: DISCONTINUED | OUTPATIENT
Start: 2024-11-14 | End: 2024-11-14 | Stop reason: HOSPADM

## 2024-11-14 RX ORDER — NEOSTIGMINE METHYLSULFATE 1 MG/ML
INJECTION INTRAVENOUS
Status: DISCONTINUED | OUTPATIENT
Start: 2024-11-14 | End: 2024-11-14 | Stop reason: SDUPTHER

## 2024-11-14 RX ORDER — MIDAZOLAM HYDROCHLORIDE 2 MG/2ML
2 INJECTION, SOLUTION INTRAMUSCULAR; INTRAVENOUS
Status: DISCONTINUED | OUTPATIENT
Start: 2024-11-14 | End: 2024-11-14 | Stop reason: HOSPADM

## 2024-11-14 RX ORDER — LIDOCAINE HYDROCHLORIDE 10 MG/ML
1 INJECTION, SOLUTION INFILTRATION; PERINEURAL
Status: DISCONTINUED | OUTPATIENT
Start: 2024-11-14 | End: 2024-11-14 | Stop reason: HOSPADM

## 2024-11-14 RX ORDER — SODIUM CHLORIDE 0.9 % (FLUSH) 0.9 %
5-40 SYRINGE (ML) INJECTION PRN
Status: DISCONTINUED | OUTPATIENT
Start: 2024-11-14 | End: 2024-11-14 | Stop reason: HOSPADM

## 2024-11-14 RX ORDER — BUPIVACAINE HYDROCHLORIDE 5 MG/ML
INJECTION, SOLUTION EPIDURAL; INTRACAUDAL PRN
Status: DISCONTINUED | OUTPATIENT
Start: 2024-11-14 | End: 2024-11-14 | Stop reason: HOSPADM

## 2024-11-14 RX ORDER — SODIUM CHLORIDE 9 MG/ML
INJECTION, SOLUTION INTRAVENOUS PRN
Status: DISCONTINUED | OUTPATIENT
Start: 2024-11-14 | End: 2024-11-14 | Stop reason: HOSPADM

## 2024-11-14 RX ORDER — PROCHLORPERAZINE EDISYLATE 5 MG/ML
5 INJECTION INTRAMUSCULAR; INTRAVENOUS
Status: DISCONTINUED | OUTPATIENT
Start: 2024-11-14 | End: 2024-11-14 | Stop reason: HOSPADM

## 2024-11-14 RX ORDER — HALOPERIDOL 5 MG/ML
1 INJECTION INTRAMUSCULAR
Status: DISCONTINUED | OUTPATIENT
Start: 2024-11-14 | End: 2024-11-14 | Stop reason: HOSPADM

## 2024-11-14 RX ORDER — PROPOFOL 10 MG/ML
INJECTION, EMULSION INTRAVENOUS
Status: DISCONTINUED | OUTPATIENT
Start: 2024-11-14 | End: 2024-11-14 | Stop reason: SDUPTHER

## 2024-11-14 RX ORDER — OXYCODONE HYDROCHLORIDE 5 MG/1
5 TABLET ORAL
Status: COMPLETED | OUTPATIENT
Start: 2024-11-14 | End: 2024-11-14

## 2024-11-14 RX ORDER — ACETAMINOPHEN 500 MG
1000 TABLET ORAL ONCE
Status: COMPLETED | OUTPATIENT
Start: 2024-11-14 | End: 2024-11-14

## 2024-11-14 RX ORDER — DEXTROSE MONOHYDRATE 100 MG/ML
INJECTION, SOLUTION INTRAVENOUS CONTINUOUS PRN
Status: DISCONTINUED | OUTPATIENT
Start: 2024-11-14 | End: 2024-11-14 | Stop reason: HOSPADM

## 2024-11-14 RX ORDER — LIDOCAINE HYDROCHLORIDE AND EPINEPHRINE 10; 10 MG/ML; UG/ML
INJECTION, SOLUTION INFILTRATION; PERINEURAL PRN
Status: DISCONTINUED | OUTPATIENT
Start: 2024-11-14 | End: 2024-11-14 | Stop reason: HOSPADM

## 2024-11-14 RX ORDER — SODIUM CHLORIDE, SODIUM LACTATE, POTASSIUM CHLORIDE, CALCIUM CHLORIDE 600; 310; 30; 20 MG/100ML; MG/100ML; MG/100ML; MG/100ML
INJECTION, SOLUTION INTRAVENOUS CONTINUOUS
Status: DISCONTINUED | OUTPATIENT
Start: 2024-11-14 | End: 2024-11-14 | Stop reason: HOSPADM

## 2024-11-14 RX ORDER — SODIUM CHLORIDE 9 MG/ML
INJECTION, SOLUTION INTRAMUSCULAR; INTRAVENOUS; SUBCUTANEOUS PRN
Status: DISCONTINUED | OUTPATIENT
Start: 2024-11-14 | End: 2024-11-14 | Stop reason: HOSPADM

## 2024-11-14 RX ORDER — SODIUM CHLORIDE 0.9 % (FLUSH) 0.9 %
5-40 SYRINGE (ML) INJECTION EVERY 12 HOURS SCHEDULED
Status: DISCONTINUED | OUTPATIENT
Start: 2024-11-14 | End: 2024-11-14 | Stop reason: HOSPADM

## 2024-11-14 RX ORDER — IBUPROFEN 600 MG/1
1 TABLET ORAL PRN
Status: DISCONTINUED | OUTPATIENT
Start: 2024-11-14 | End: 2024-11-14 | Stop reason: HOSPADM

## 2024-11-14 RX ORDER — GLYCOPYRROLATE 0.2 MG/ML
INJECTION INTRAMUSCULAR; INTRAVENOUS
Status: DISCONTINUED | OUTPATIENT
Start: 2024-11-14 | End: 2024-11-14 | Stop reason: SDUPTHER

## 2024-11-14 RX ORDER — SODIUM CHLORIDE 0.9 % (FLUSH) 0.9 %
SYRINGE (ML) INJECTION
Status: DISCONTINUED | OUTPATIENT
Start: 2024-11-14 | End: 2024-11-14 | Stop reason: SDUPTHER

## 2024-11-14 RX ORDER — HYDROMORPHONE HYDROCHLORIDE 2 MG/ML
INJECTION, SOLUTION INTRAMUSCULAR; INTRAVENOUS; SUBCUTANEOUS
Status: DISCONTINUED | OUTPATIENT
Start: 2024-11-14 | End: 2024-11-14 | Stop reason: SDUPTHER

## 2024-11-14 RX ORDER — LIDOCAINE HYDROCHLORIDE 20 MG/ML
INJECTION, SOLUTION EPIDURAL; INFILTRATION; INTRACAUDAL; PERINEURAL
Status: DISCONTINUED | OUTPATIENT
Start: 2024-11-14 | End: 2024-11-14 | Stop reason: SDUPTHER

## 2024-11-14 RX ORDER — NALOXONE HYDROCHLORIDE 0.4 MG/ML
INJECTION, SOLUTION INTRAMUSCULAR; INTRAVENOUS; SUBCUTANEOUS PRN
Status: DISCONTINUED | OUTPATIENT
Start: 2024-11-14 | End: 2024-11-14 | Stop reason: HOSPADM

## 2024-11-14 RX ORDER — ONDANSETRON 2 MG/ML
INJECTION INTRAMUSCULAR; INTRAVENOUS
Status: DISCONTINUED | OUTPATIENT
Start: 2024-11-14 | End: 2024-11-14 | Stop reason: SDUPTHER

## 2024-11-14 RX ORDER — EPHEDRINE SULFATE 5 MG/ML
INJECTION INTRAVENOUS
Status: DISCONTINUED | OUTPATIENT
Start: 2024-11-14 | End: 2024-11-14 | Stop reason: SDUPTHER

## 2024-11-14 RX ORDER — DEXAMETHASONE SODIUM PHOSPHATE 4 MG/ML
INJECTION, SOLUTION INTRA-ARTICULAR; INTRALESIONAL; INTRAMUSCULAR; INTRAVENOUS; SOFT TISSUE
Status: DISCONTINUED | OUTPATIENT
Start: 2024-11-14 | End: 2024-11-14 | Stop reason: SDUPTHER

## 2024-11-14 RX ORDER — DIPHENHYDRAMINE HYDROCHLORIDE 50 MG/ML
12.5 INJECTION INTRAMUSCULAR; INTRAVENOUS
Status: DISCONTINUED | OUTPATIENT
Start: 2024-11-14 | End: 2024-11-14 | Stop reason: HOSPADM

## 2024-11-14 RX ORDER — ROCURONIUM BROMIDE 10 MG/ML
INJECTION, SOLUTION INTRAVENOUS
Status: DISCONTINUED | OUTPATIENT
Start: 2024-11-14 | End: 2024-11-14 | Stop reason: SDUPTHER

## 2024-11-14 RX ADMIN — ROCURONIUM BROMIDE 15 MG: 10 INJECTION, SOLUTION INTRAVENOUS at 12:31

## 2024-11-14 RX ADMIN — PHENYLEPHRINE HYDROCHLORIDE 100 MCG: 0.1 INJECTION, SOLUTION INTRAVENOUS at 13:06

## 2024-11-14 RX ADMIN — CEFAZOLIN 2000 MG: 2 INJECTION, POWDER, FOR SOLUTION INTRAMUSCULAR; INTRAVENOUS at 11:20

## 2024-11-14 RX ADMIN — GLYCOPYRROLATE 0.8 MG: 0.2 INJECTION INTRAMUSCULAR; INTRAVENOUS at 13:36

## 2024-11-14 RX ADMIN — ACETAMINOPHEN 1000 MG: 500 TABLET, FILM COATED ORAL at 10:33

## 2024-11-14 RX ADMIN — PROPOFOL 150 MG: 10 INJECTION, EMULSION INTRAVENOUS at 11:10

## 2024-11-14 RX ADMIN — EPHEDRINE SULFATE 10 MG: 5 INJECTION INTRAVENOUS at 12:59

## 2024-11-14 RX ADMIN — LIDOCAINE HYDROCHLORIDE 100 MG: 20 INJECTION, SOLUTION EPIDURAL; INFILTRATION; INTRACAUDAL; PERINEURAL at 11:10

## 2024-11-14 RX ADMIN — EPHEDRINE SULFATE 5 MG: 5 INJECTION INTRAVENOUS at 11:17

## 2024-11-14 RX ADMIN — EPHEDRINE SULFATE 5 MG: 5 INJECTION INTRAVENOUS at 12:02

## 2024-11-14 RX ADMIN — HYDROMORPHONE HYDROCHLORIDE 0.4 MG: 2 INJECTION INTRAMUSCULAR; INTRAVENOUS; SUBCUTANEOUS at 11:10

## 2024-11-14 RX ADMIN — OXYCODONE 5 MG: 5 TABLET ORAL at 14:34

## 2024-11-14 RX ADMIN — SODIUM CHLORIDE 150 MG: 9 INJECTION, SOLUTION INTRAVENOUS at 10:34

## 2024-11-14 RX ADMIN — PHENYLEPHRINE HYDROCHLORIDE 150 MCG: 0.1 INJECTION, SOLUTION INTRAVENOUS at 13:16

## 2024-11-14 RX ADMIN — EPHEDRINE SULFATE 5 MG: 5 INJECTION INTRAVENOUS at 12:41

## 2024-11-14 RX ADMIN — EPHEDRINE SULFATE 5 MG: 5 INJECTION INTRAVENOUS at 11:29

## 2024-11-14 RX ADMIN — ROCURONIUM BROMIDE 50 MG: 10 INJECTION, SOLUTION INTRAVENOUS at 11:11

## 2024-11-14 RX ADMIN — HYDROMORPHONE HYDROCHLORIDE 0.2 MG: 2 INJECTION INTRAMUSCULAR; INTRAVENOUS; SUBCUTANEOUS at 12:31

## 2024-11-14 RX ADMIN — SODIUM CHLORIDE, PRESERVATIVE FREE 30 ML: 5 INJECTION INTRAVENOUS at 13:46

## 2024-11-14 RX ADMIN — HYDROMORPHONE HYDROCHLORIDE 0.4 MG: 2 INJECTION INTRAMUSCULAR; INTRAVENOUS; SUBCUTANEOUS at 10:59

## 2024-11-14 RX ADMIN — NEOSTIGMINE METHYLSULFATE 5 MG: 1 INJECTION, SOLUTION INTRAVENOUS at 13:36

## 2024-11-14 RX ADMIN — ONDANSETRON 4 MG: 2 INJECTION INTRAMUSCULAR; INTRAVENOUS at 13:23

## 2024-11-14 RX ADMIN — DEXAMETHASONE SODIUM PHOSPHATE 4 MG: 4 INJECTION, SOLUTION INTRAMUSCULAR; INTRAVENOUS at 11:20

## 2024-11-14 ASSESSMENT — PAIN DESCRIPTION - LOCATION
LOCATION: BREAST

## 2024-11-14 ASSESSMENT — PAIN DESCRIPTION - ORIENTATION
ORIENTATION: RIGHT;LEFT

## 2024-11-14 ASSESSMENT — PAIN - FUNCTIONAL ASSESSMENT: PAIN_FUNCTIONAL_ASSESSMENT: NONE - DENIES PAIN

## 2024-11-14 ASSESSMENT — PAIN DESCRIPTION - DESCRIPTORS
DESCRIPTORS: ACHING

## 2024-11-14 ASSESSMENT — PAIN SCALES - GENERAL
PAINLEVEL_OUTOF10: 4

## 2024-11-14 NOTE — DISCHARGE INSTRUCTIONS
sudden chest pain and shortness of breath, or you cough up blood.   Call your doctor now or seek immediate medical care if:    You have pain that does not get better after you take pain medicine.     You have loose stitches, or your incision comes open.     You are bleeding from the incision.     You have signs of infection, such as:  Increased pain, swelling, warmth, or redness.  Red streaks leading from the incision.  Pus draining from the incision.  A fever.     You have signs of a blood clot in your leg, such as:  Pain in your calf, back of the knee, thigh, or groin.  Redness and swelling in your leg or groin.   Watch closely for any changes in your health, and be sure to contact your doctor if:    You do not get better as expected.   Where can you learn more?  Go to https://www.[employer].net/patientEd and enter T113 to learn more about \"Breast Reduction Surgery: What to Expect at Home.\"  Current as of: September 20, 2023  Content Version: 14.2  © 2024 [employer].   Care instructions adapted under license by [employer]. If you have questions about a medical condition or this instruction, always ask your healthcare professional. [employer] disclaims any warranty or liability for your use of this information.    MEDICATION INTERACTION:  During your procedure you potentially received a medication or medications which may reduce the effectiveness of oral contraceptives. Please consider other forms of contraception for 1 month following your procedure if you are currently using oral contraceptives as your primary form of birth control. In addition to this, we recommend continuing your oral contraceptive as prescribed, unless otherwise instructed by your physician, during this time    After general anesthesia or intravenous sedation, for 24 hours or while taking prescription Narcotics:  Limit your activities  Some people will feel drowsy or dizzy for up to a few hours after waking up.  A  responsible adult needs to be with you for the next 24 hours  Do not drive and operate hazardous machinery  Do not make important personal or business decisions  Do not drink alcoholic beverages  If you have not urinated within 8 hours after discharge, and you are experiencing discomfort from urinary retention, please go to the nearest ED.  If you have sleep apnea and have a CPAP machine, please use it for all naps and sleeping.  Please use caution when taking narcotics and any of your home medications that may cause drowsiness.  *  Please give a list of your current medications to your Primary Care Provider.  *  Please update this list whenever your medications are discontinued, doses are      changed, or new medications (including over-the-counter products) are added.  *  Please carry medication information at all times in case of emergency situations.    These are general instructions for a healthy lifestyle:  No smoking/ No tobacco products/ Avoid exposure to second hand smoke  Surgeon General's Warning:  Quitting smoking now greatly reduces serious risk to your health.  Obesity, smoking, and sedentary lifestyle greatly increases your risk for illness  A healthy diet, regular physical exercise & weight monitoring are important for maintaining a healthy lifestyle    You may be retaining fluid if you have a history of heart failure or if you experience any of the following symptoms:  Weight gain of 3 pounds or more overnight or 5 pounds in a week, increased swelling in our hands or feet or shortness of breath while lying flat in bed.  Please call your doctor as soon as you notice any of these symptoms; do not wait until your next office visit.

## 2024-11-14 NOTE — ANESTHESIA PRE PROCEDURE
REPAIR    UROLOGICAL SURGERY      sling    WRIST FRACTURE SURGERY Left     Ganglion       Social History:    Social History     Tobacco Use    Smoking status: Never    Smokeless tobacco: Never   Substance Use Topics    Alcohol use: Yes     Comment: occ                                Counseling given: Not Answered      Vital Signs (Current):   Vitals:    11/01/24 1508 11/14/24 0950 11/14/24 0954   BP:  (!) 155/76    Pulse:  67 67   Resp:  18    Temp:  98.2 °F (36.8 °C)    TempSrc:  Oral    SpO2:  94%    Weight: 82.6 kg (182 lb) 84.4 kg (186 lb)    Height: 1.676 m (5' 6\") 1.676 m (5' 6\")                                               BP Readings from Last 3 Encounters:   11/14/24 (!) 155/76   11/08/22 (!) 162/88       NPO Status: Time of last liquid consumption: 0700                        Time of last solid consumption: 1800                        Date of last liquid consumption: 11/14/24                        Date of last solid food consumption: 11/13/24    BMI:   Wt Readings from Last 3 Encounters:   11/14/24 84.4 kg (186 lb)   11/08/22 88 kg (194 lb)     Body mass index is 30.02 kg/m².    CBC: No results found for: \"WBC\", \"RBC\", \"HGB\", \"HCT\", \"MCV\", \"RDW\", \"PLT\"    CMP: No results found for: \"NA\", \"K\", \"CL\", \"CO2\", \"BUN\", \"CREATININE\", \"GFRAA\", \"AGRATIO\", \"LABGLOM\", \"GLUCOSE\", \"GLU\", \"CALCIUM\", \"BILITOT\", \"ALKPHOS\", \"AST\", \"ALT\"    POC Tests: No results for input(s): \"POCGLU\", \"POCNA\", \"POCK\", \"POCCL\", \"POCBUN\", \"POCHEMO\", \"POCHCT\" in the last 72 hours.    Coags: No results found for: \"PROTIME\", \"INR\", \"APTT\"    HCG (If Applicable): No results found for: \"PREGTESTUR\", \"PREGSERUM\", \"HCG\", \"HCGQUANT\"     ABGs: No results found for: \"PHART\", \"PO2ART\", \"ATW4RVL\", \"JRP1YSS\", \"BEART\", \"H8GTFHQR\"     Type & Screen (If Applicable):  No results found for: \"ABORH\", \"LABANTI\"    Drug/Infectious Status (If Applicable):  No results found for: \"HIV\", \"HEPCAB\"    COVID-19 Screening (If Applicable):   Lab Results   Component

## 2024-11-14 NOTE — ANESTHESIA POSTPROCEDURE EVALUATION
Department of Anesthesiology  Postprocedure Note    Patient: Toni Ray  MRN: [id number]  YOB: 1960  Date of evaluation: 11/14/2024    Procedure Summary       Date: 11/14/24 Room / Location: [facility] MAIN OR  / [facility] MAIN OR    Anesthesia Start: 1059 Anesthesia Stop: 1354    Procedure: BREAST BREAST REDUCTION (Bilateral: Breast) Diagnosis:       Hypertrophy of breast      Musculoskeletal disorder of the suboccipital      Low back pain, unspecified back pain laterality, unspecified chronicity, unspecified whether sciatica present      Intertrigo      Mastodynia    Providers: Blakemore, Elizabeth R, MD Responsible Provider: Fabio Carr MD    Anesthesia Type: General ASA Status: 3            Anesthesia Type: General    Gibson Phase I: Gibson Score: 8    Gibson Phase II: Gibson Score: 10    Anesthesia Post Evaluation    Patient location during evaluation: PACU  Patient participation: complete - patient participated  Level of consciousness: awake and alert  Airway patency: patent  Nausea: well controlled.  Cardiovascular status: acceptable.  Respiratory status: acceptable  Hydration status: stable  Pain management: adequate    No notable events documented.

## 2024-11-14 NOTE — ANESTHESIA PROCEDURE NOTES
Airway  Date/Time: 11/14/2024 11:13 AM  Urgency: elective    Airway not difficult    General Information and Staff    Patient location during procedure: OR  Resident/CRNA: Esthela Horn APRN - CRNA  Performed: resident/CRNA   Performed by: Esthela Horn APRN - CRNA  Authorized by: Gogo Ward MD      Indications and Patient Condition  Indications for airway management: anesthesia  Spontaneous Ventilation: absent  Sedation level: deep  Preoxygenated: yes  Patient position: sniffing  MILS not maintained throughout  Mask difficulty assessment: vent by bag mask    Final Airway Details  Final airway type: endotracheal airway      Successful airway: ETT  Cuffed: yes   Successful intubation technique: direct laryngoscopy  Facilitating devices/methods: intubating stylet  Endotracheal tube insertion site: oral  Blade: Wendy  Blade size: #3  ETT size (mm): 7.0  Cormack-Lehane Classification: grade I - full view of glottis  Placement verified by: chest auscultation and capnometry   Measured from: lips  ETT to lips (cm): 21  Number of attempts at approach: 1  Ventilation between attempts: bag mask  Number of other approaches attempted: 0    no

## 2024-11-14 NOTE — BRIEF OP NOTE
Brief Postoperative Note      Patient: Toni Ray  YOB: 1960  MRN: [id number]    Date of Procedure: 11/14/2024    Pre-Op Diagnosis Codes:      * Hypertrophy of breast [N62]     * Musculoskeletal disorder of the suboccipital [M53.82]     * Low back pain, unspecified back pain laterality, unspecified chronicity, unspecified whether sciatica present [M54.50]     * Intertrigo [L30.4]     * Mastodynia [N64.4]    Post-Op Diagnosis: Same       Procedure(s):  BREAST BREAST REDUCTION    Surgeon(s):  Blakemore, Elizabeth R, MD    Assistant:  * No surgical staff found *    Anesthesia: General    Estimated Blood Loss (mL): less than 50     Complications: None    Specimens:   ID Type Source Tests Collected by Time Destination   A : RIGHT BREAST TISSUE Tissue Breast SURGICAL PATHOLOGY Blakemore, Elizabeth R, MD 11/14/2024 1158    B : LEFT BREAST TISSUE Tissue Breast SURGICAL PATHOLOGY Blakemore, Elizabeth R, MD 11/14/2024 1218        Implants:  * No implants in log *      Drains: * No LDAs found *    Findings:  Infection Present At Time Of Surgery (PATOS) (choose all levels that have infection present):  No infection present  Other Findings: none    Electronically signed by ELIZABETH R BLAKEMORE, MD on 11/14/2024 at 1:39 PM

## (undated) DEVICE — SUPPORT MAMM SURG 48-50 IN 2XL BRA

## (undated) DEVICE — SUTURE PDS II SZ 2-0 L27IN ABSRB VLT L36MM CT-1 1/2 CIR Z339H

## (undated) DEVICE — SUTURE GORTX SZ 0 L48IN NONABSORBABLE L4MM PS-4 PIERCING PT 2U22A

## (undated) DEVICE — CARDINAL HEALTH FLEXIBLE LIGHT HANDLE COVER: Brand: CARDINAL HEALTH

## (undated) DEVICE — PUMP PAIN MGMT 400ML 4ML/HR 2 W/ SIL SOAK CATH FOR ABD

## (undated) DEVICE — GLOVE SURG SZ 65 CRM LTX FREE POLYISOPRENE POLYMER BEAD ANTI

## (undated) DEVICE — Device

## (undated) DEVICE — DEVICE SUT SHFT DIA3MM HD W6.3MM CARR DIA1.2MM 38.2GM FOR

## (undated) DEVICE — INTENT TO BE USED WITH SUTURE MATERIAL FOR TISSUE CLOSURE: Brand: RICHARD-ALLAN® NEEDLE 3/8 CIRCLE TROCAR

## (undated) DEVICE — SPONGE LAP W18XL18IN WHT COT 4 PLY FLD STRUNG RADPQ DISP ST 2 PER PACK

## (undated) DEVICE — TUBING, SUCTION, 1/4" X 10', STRAIGHT: Brand: MEDLINE

## (undated) DEVICE — 3M™ MEDIPORE™ H SOFT CLOTH SURGICAL TAPE 2864, 4 INCH X 10 YARD (10CM X 9,14M), 12 ROLLS/CASE: Brand: 3M™ MEDIPORE™

## (undated) DEVICE — TRAY PREP DRY W/ PREM GLV 2 APPL 6 SPNG 2 UNDPD 1 OVERWRAP

## (undated) DEVICE — BANDAGE,GAUZE,BULKEE II,4.5"X4.1YD,STRL: Brand: MEDLINE

## (undated) DEVICE — SOLUTION IV 1000ML 0.9% SOD CHL

## (undated) DEVICE — UNIVERSAL DRAPES: Brand: MEDLINE INDUSTRIES, INC.

## (undated) DEVICE — SOLUTION IRRIG 1000ML 0.9% SOD CHL USP POUR PLAS BTL

## (undated) DEVICE — SUTURE MONOCRYL SZ 5-0 L18IN ABSRB UD L19MM PS-2 3/8 CIR PRIM Y495G

## (undated) DEVICE — TRAY CATH 16F DRN BG LTX -- CONVERT TO ITEM 363158

## (undated) DEVICE — 2000CC GUARDIAN II: Brand: GUARDIAN

## (undated) DEVICE — REM POLYHESIVE ADULT PATIENT RETURN ELECTRODE: Brand: VALLEYLAB

## (undated) DEVICE — DRESSING,GAUZE,XEROFORM,CURAD,5"X9",ST: Brand: CURAD

## (undated) DEVICE — RING RETRCTR FIG 8 32.5X18.3CM -- PLAS STRL W/2 CATH CLIPS

## (undated) DEVICE — HOOK RETRCT L5MM E SHRP SELF RET SYS LONE STAR

## (undated) DEVICE — BLADE SURG NO15 S STL STR DISP GLASSVAN

## (undated) DEVICE — GLOVE SURG SZ 65 L12IN FNGR THK79MIL GRN LTX FREE

## (undated) DEVICE — MINOR SPLIT GENERAL: Brand: MEDLINE INDUSTRIES, INC.

## (undated) DEVICE — GARMENT,MEDLINE,DVT,INT,CALF,MED, GEN2: Brand: MEDLINE

## (undated) DEVICE — DRAPE,LITHOTOMY,STERILE: Brand: MEDLINE

## (undated) DEVICE — PAD,ABDOMINAL,5"X9",ST,LF,25/BX: Brand: MEDLINE INDUSTRIES, INC.

## (undated) DEVICE — LITHOTOMY: Brand: MEDLINE INDUSTRIES, INC.

## (undated) DEVICE — INTENDED FOR TISSUE SEPARATION, AND OTHER PROCEDURES THAT REQUIRE A SHARP SURGICAL BLADE TO PUNCTURE OR CUT.: Brand: BARD-PARKER ® STAINLESS STEEL BLADES

## (undated) DEVICE — SUTURE VICRYL SZ 4-0 L18IN ABSRB UD L19MM PS-2 3/8 CIR PRIM J496H

## (undated) DEVICE — SUTURE ABSRB X-1 REV CUT 1/2 CIR 22MM UD BRAID 27IN SZ 3-0 J458H

## (undated) DEVICE — GOWN,SIRUS,NONRNF,SETINSLV,XL,20/CS: Brand: MEDLINE

## (undated) DEVICE — NEEDLE HYPO 21GA L1.5IN INTRAMUSCULAR S STL LATCH BVL UP

## (undated) DEVICE — SKIN PREP TRAY 4 COMPARTM TRAY: Brand: MEDLINE INDUSTRIES, INC.

## (undated) DEVICE — GLOVE SURG SZ 65 THK91MIL LTX FREE SYN POLYISOPRENE

## (undated) DEVICE — SUTURE VCRL SZ 2-0 L36IN ABSRB UD L36MM CT-1 1/2 CIR J945H

## (undated) DEVICE — SUTURE MONOCRYL SZ 4-0 L18IN ABSRB UD L19MM PS-2 3/8 CIR PRIM Y496G